# Patient Record
Sex: MALE | Race: WHITE | NOT HISPANIC OR LATINO | Employment: FULL TIME | URBAN - METROPOLITAN AREA
[De-identification: names, ages, dates, MRNs, and addresses within clinical notes are randomized per-mention and may not be internally consistent; named-entity substitution may affect disease eponyms.]

---

## 2018-11-02 ENCOUNTER — CONVERSION ENCOUNTER (OUTPATIENT)
Dept: FAMILY MEDICINE CLINIC | Facility: CLINIC | Age: 53
End: 2018-11-02

## 2018-11-02 ENCOUNTER — OFFICE VISIT CONVERTED (OUTPATIENT)
Dept: FAMILY MEDICINE CLINIC | Facility: CLINIC | Age: 53
End: 2018-11-02
Attending: NURSE PRACTITIONER

## 2019-04-03 ENCOUNTER — HOSPITAL ENCOUNTER (OUTPATIENT)
Dept: LAB | Facility: HOSPITAL | Age: 54
Discharge: HOME | End: 2019-04-03
Attending: NURSE PRACTITIONER

## 2019-04-03 ENCOUNTER — CONVERSION ENCOUNTER (OUTPATIENT)
Dept: FAMILY MEDICINE CLINIC | Facility: CLINIC | Age: 54
End: 2019-04-03

## 2019-04-03 ENCOUNTER — OFFICE VISIT CONVERTED (OUTPATIENT)
Dept: FAMILY MEDICINE CLINIC | Facility: CLINIC | Age: 54
End: 2019-04-03
Attending: NURSE PRACTITIONER

## 2019-04-03 ENCOUNTER — HOSPITAL ENCOUNTER (OUTPATIENT)
Dept: GENERAL RADIOLOGY | Facility: HOSPITAL | Age: 54
Discharge: HOME | End: 2019-04-03
Attending: NURSE PRACTITIONER

## 2019-04-03 LAB
BASOPHILS # BLD AUTO: 0.04 10*3/UL (ref 0–0.2)
BASOPHILS NFR BLD AUTO: 0.5 % (ref 0–3)
CONV ABS IMM GRAN: 0.02 10*3/UL (ref 0–0.2)
CONV IMMATURE GRAN: 0.2 % (ref 0–1.8)
DEPRECATED RDW RBC AUTO: 47.8 FL (ref 35.1–43.9)
EOSINOPHIL # BLD AUTO: 0.41 10*3/UL (ref 0–0.7)
EOSINOPHIL # BLD AUTO: 5.1 % (ref 0–7)
ERYTHROCYTE [DISTWIDTH] IN BLOOD BY AUTOMATED COUNT: 13.4 % (ref 11.6–14.4)
HBA1C MFR BLD: 15 G/DL (ref 14–18)
HCT VFR BLD AUTO: 46 % (ref 42–52)
LYMPHOCYTES # BLD AUTO: 1.96 10*3/UL (ref 1–5)
MCH RBC QN AUTO: 31.4 PG (ref 27–31)
MCHC RBC AUTO-ENTMCNC: 32.6 G/DL (ref 33–37)
MCV RBC AUTO: 96.4 FL (ref 80–96)
MONOCYTES # BLD AUTO: 0.72 10*3/UL (ref 0.2–1.2)
MONOCYTES NFR BLD AUTO: 8.9 % (ref 3–10)
NEUTROPHILS # BLD AUTO: 4.91 10*3/UL (ref 2–8)
NEUTROPHILS NFR BLD AUTO: 61 % (ref 30–85)
NRBC CBCN: 0 % (ref 0–0.7)
PLATELET # BLD AUTO: 141 10*3/UL (ref 130–400)
PMV BLD AUTO: 12.4 FL (ref 9.4–12.4)
RBC # BLD AUTO: 4.77 10*6/UL (ref 4.7–6.1)
VARIANT LYMPHS NFR BLD MANUAL: 24.3 % (ref 20–45)
WBC # BLD AUTO: 8.06 10*3/UL (ref 4.8–10.8)

## 2019-05-17 ENCOUNTER — OFFICE VISIT CONVERTED (OUTPATIENT)
Dept: FAMILY MEDICINE CLINIC | Facility: CLINIC | Age: 54
End: 2019-05-17
Attending: NURSE PRACTITIONER

## 2019-05-28 ENCOUNTER — HOSPITAL ENCOUNTER (OUTPATIENT)
Dept: GENERAL RADIOLOGY | Facility: HOSPITAL | Age: 54
Discharge: HOME | End: 2019-05-28
Attending: NURSE PRACTITIONER

## 2019-07-05 ENCOUNTER — OFFICE VISIT CONVERTED (OUTPATIENT)
Dept: ORTHOPEDIC SURGERY | Facility: CLINIC | Age: 54
End: 2019-07-05
Attending: ORTHOPAEDIC SURGERY

## 2019-07-05 ENCOUNTER — CONVERSION ENCOUNTER (OUTPATIENT)
Dept: ORTHOPEDIC SURGERY | Facility: CLINIC | Age: 54
End: 2019-07-05

## 2019-07-09 ENCOUNTER — HOSPITAL ENCOUNTER (OUTPATIENT)
Dept: PHYSICAL THERAPY | Facility: CLINIC | Age: 54
Setting detail: RECURRING SERIES
Discharge: HOME | End: 2019-08-23
Attending: ORTHOPAEDIC SURGERY

## 2019-08-06 ENCOUNTER — OFFICE VISIT CONVERTED (OUTPATIENT)
Dept: ORTHOPEDIC SURGERY | Facility: CLINIC | Age: 54
End: 2019-08-06
Attending: PHYSICIAN ASSISTANT

## 2020-04-06 ENCOUNTER — TELEMEDICINE CONVERTED (OUTPATIENT)
Dept: CARDIOLOGY | Facility: CLINIC | Age: 55
End: 2020-04-06
Attending: INTERNAL MEDICINE

## 2020-06-30 ENCOUNTER — HOSPITAL ENCOUNTER (OUTPATIENT)
Dept: LAB | Facility: HOSPITAL | Age: 55
Discharge: HOME | End: 2020-06-30
Attending: INTERNAL MEDICINE

## 2020-06-30 LAB
ALBUMIN SERPL-MCNC: 3.8 G/DL (ref 3.5–5)
ALBUMIN/GLOB SERPL: 1.3 {RATIO} (ref 1.4–2.6)
ALP SERPL-CCNC: 86 U/L (ref 56–119)
ALT SERPL-CCNC: 38 U/L (ref 10–40)
ANION GAP SERPL CALC-SCNC: 17 MMOL/L (ref 8–19)
AST SERPL-CCNC: 25 U/L (ref 15–50)
BILIRUB SERPL-MCNC: 0.68 MG/DL (ref 0.2–1.3)
BUN SERPL-MCNC: 15 MG/DL (ref 5–25)
BUN/CREAT SERPL: 14 {RATIO} (ref 6–20)
CALCIUM SERPL-MCNC: 9.1 MG/DL (ref 8.7–10.4)
CHLORIDE SERPL-SCNC: 112 MMOL/L (ref 99–111)
CHOLEST SERPL-MCNC: 88 MG/DL (ref 107–200)
CHOLEST/HDLC SERPL: 3.4 {RATIO} (ref 3–6)
CONV CO2: 20 MMOL/L (ref 22–32)
CONV TOTAL PROTEIN: 6.8 G/DL (ref 6.3–8.2)
CREAT UR-MCNC: 1.07 MG/DL (ref 0.7–1.2)
GFR SERPLBLD BASED ON 1.73 SQ M-ARVRAT: >60 ML/MIN/{1.73_M2}
GLOBULIN UR ELPH-MCNC: 3 G/DL (ref 2–3.5)
GLUCOSE SERPL-MCNC: 89 MG/DL (ref 70–99)
HDLC SERPL-MCNC: 26 MG/DL (ref 40–60)
LDLC SERPL CALC-MCNC: 44 MG/DL (ref 70–100)
OSMOLALITY SERPL CALC.SUM OF ELEC: 300 MOSM/KG (ref 273–304)
POTASSIUM SERPL-SCNC: 4.1 MMOL/L (ref 3.5–5.3)
SODIUM SERPL-SCNC: 145 MMOL/L (ref 135–147)
TRIGL SERPL-MCNC: 91 MG/DL (ref 40–150)
VLDLC SERPL-MCNC: 18 MG/DL (ref 5–37)

## 2020-07-06 ENCOUNTER — CONVERSION ENCOUNTER (OUTPATIENT)
Dept: CARDIOLOGY | Facility: CLINIC | Age: 55
End: 2020-07-06

## 2020-07-06 ENCOUNTER — OFFICE VISIT CONVERTED (OUTPATIENT)
Dept: CARDIOLOGY | Facility: CLINIC | Age: 55
End: 2020-07-06
Attending: INTERNAL MEDICINE

## 2021-01-06 ENCOUNTER — HOSPITAL ENCOUNTER (OUTPATIENT)
Dept: LAB | Facility: HOSPITAL | Age: 56
Discharge: HOME | End: 2021-01-06
Attending: INTERNAL MEDICINE

## 2021-01-06 LAB
ALBUMIN SERPL-MCNC: 4.1 G/DL (ref 3.5–5)
ALBUMIN/GLOB SERPL: 1.2 {RATIO} (ref 1.4–2.6)
ALP SERPL-CCNC: 82 U/L (ref 56–119)
ALT SERPL-CCNC: 49 U/L (ref 10–40)
ANION GAP SERPL CALC-SCNC: 15 MMOL/L (ref 8–19)
AST SERPL-CCNC: 32 U/L (ref 15–50)
BILIRUB SERPL-MCNC: 0.64 MG/DL (ref 0.2–1.3)
BUN SERPL-MCNC: 19 MG/DL (ref 5–25)
BUN/CREAT SERPL: 18 {RATIO} (ref 6–20)
CALCIUM SERPL-MCNC: 9.5 MG/DL (ref 8.7–10.4)
CHLORIDE SERPL-SCNC: 108 MMOL/L (ref 99–111)
CHOLEST SERPL-MCNC: 106 MG/DL (ref 107–200)
CHOLEST/HDLC SERPL: 3.1 {RATIO} (ref 3–6)
CONV CO2: 21 MMOL/L (ref 22–32)
CONV TOTAL PROTEIN: 7.4 G/DL (ref 6.3–8.2)
CREAT UR-MCNC: 1.05 MG/DL (ref 0.7–1.2)
EST. AVERAGE GLUCOSE BLD GHB EST-MCNC: 117 MG/DL
GFR SERPLBLD BASED ON 1.73 SQ M-ARVRAT: >60 ML/MIN/{1.73_M2}
GLOBULIN UR ELPH-MCNC: 3.3 G/DL (ref 2–3.5)
GLUCOSE SERPL-MCNC: 94 MG/DL (ref 70–99)
HBA1C MFR BLD: 5.7 % (ref 3.5–5.7)
HDLC SERPL-MCNC: 34 MG/DL (ref 40–60)
LDLC SERPL CALC-MCNC: 46 MG/DL (ref 70–100)
OSMOLALITY SERPL CALC.SUM OF ELEC: 292 MOSM/KG (ref 273–304)
POTASSIUM SERPL-SCNC: 4.2 MMOL/L (ref 3.5–5.3)
SODIUM SERPL-SCNC: 140 MMOL/L (ref 135–147)
TRIGL SERPL-MCNC: 132 MG/DL (ref 40–150)
VLDLC SERPL-MCNC: 26 MG/DL (ref 5–37)

## 2021-01-12 ENCOUNTER — OFFICE VISIT CONVERTED (OUTPATIENT)
Dept: CARDIOLOGY | Facility: CLINIC | Age: 56
End: 2021-01-12
Attending: INTERNAL MEDICINE

## 2021-01-28 ENCOUNTER — HOSPITAL ENCOUNTER (OUTPATIENT)
Dept: NUCLEAR MEDICINE | Facility: HOSPITAL | Age: 56
Discharge: HOME | End: 2021-01-28
Attending: INTERNAL MEDICINE

## 2021-02-10 ENCOUNTER — CONVERSION ENCOUNTER (OUTPATIENT)
Dept: CARDIOLOGY | Facility: CLINIC | Age: 56
End: 2021-02-10
Attending: INTERNAL MEDICINE

## 2021-05-10 NOTE — PROCEDURES
"   Procedure Note      Patient Name: Demetrio Mantilla   Patient ID: [id number]   Sex: Male   YOB: 1965    Primary Care Provider: Lonnie PATEL    Visit Date: February 10, 2021    Provider: Aaron Minaya MD   Location: [facility]   Location Address: [address] [place] [address], [address]   [place]  [id number]   Location Phone: [phone]          FINAL REPORT   TRANSTHORACIC ECHOCARDIOGRAM REPORT    Diagnosis: Chest pain, coronary artery disease.   Height: 6'3\" Weight: 251 B/P: 156/90 BSA: 2.4   Tech: JTW   MEASUREMENTS:  RVID (Diastole) : RVID. (NORMAL: 0.7 to 2.4 cm max)   LVID (Systole): 3.4 cm (Diastole): 5.1 cm . (NORMAL: 3.7 - 5.4 cm)   Posterior Wall Thickness (Diastole): 0.9 cm. (NORMAL: 0.8 - 1.1 cm)   Septal Thickness (Diastole): 0.9 cm. (NORMAL: 0.7 - 1.2 cm)   LAID (Systole): 3.6 cm. (NORMAL: 1.9 - 3.8 cm)   Aortic Root Diameter (Diastole): 3.5 cm. (NORMAL: 2.0 - 3.7 cm)   COMMENTS:  The patient underwent 2-D, M-Mode, and Doppler examination, including pulse-wave, continuous-wave, and color-flow analysis; the study is technically adequate.   FINDINGS:  MITRAL VALVE: Normal in appearance, opens well. No evidence of mitral valve prolapse. No mitral stenosis. Trace mitral regurgitation.   AORTIC VALVE: Normal trileaflet aortic valve, opens well. No evidence of aortic stenosis or regurgitation on Doppler examination.   TRICUSPID VALVE: Normal in appearance, opens well. Trace tricuspid regurgitation. Normal pulmonary artery systolic pressure.   PULMONIC VALVE: Grossly normal.   AORTIC ROOT: Normal in size with adequate motion.   LEFT ATRIUM: Normal in size. No intracavitary masses or clots seen. LA volume index is 27 mL/m2.   LEFT VENTRICLE: The left ventricular chamber size is normal. The left ventricular wall thickness is normal. There is mild mid inferior wall hypokinesis. Overall LV ejection fraction is 55%.   RIGHT VENTRICLE: Normal size and function.   RIGHT ATRIUM: Normal in size. "   PERICARDIUM: No evidence of pericardial effusion.   INFERIOR VENA CAVA: Measures 1.8 cm in diameter and there is more than 50% collapse during inspiration.   DOPPLER: E/A ratio is 1.2. DT= 261 msec. IVRT is 95 msec. E/E' is 7.   Faxed: 02/12/2021      CONCLUSION:  1.  Overall LV ejection fraction is 55% with mild mid inferior wall hypokinesis.   2.  No hemodynamically significant valvular abnormalities.       Aaron Minaya MD  JV/pap                 Electronically Signed by: Mireille Ramos-, Other -Author on February 12, 2021 10:30:06 AM  Electronically Co-signed by: Aaron Minaya MD -Reviewer on February 12, 2021 12:02:10 PM

## 2021-05-12 NOTE — PROGRESS NOTES
Quick Note      Patient Name: Demetrio Mantilla   Patient ID: [id number]   Sex: Male   YOB: 1965    Primary Care Provider: Lonnie PATEL    Visit Date: April 6, 2020    Provider: Aaron Minaya MD   Location: [facility]   Location Address: [address] [place] [address], [facility]   [place], [place]  [id number]   Location Phone: [phone]          History Of Present Illness  TELEHEALTH VISIT  Chief Complaint: Recent myocardial infarction.   Demetrio Mantilla is a 54-year-old male with no previous past medical history, who is presenting for evaluation via telehealth visit. Verbal consent obtained before beginning visit. He suffered an acute myocardial infarction on 03/19/2020. He underwent angioplasty and stent placement to completely occluded right coronary artery. Left ventricular ejection fraction was 55% at the time of discharge. He was discharged on dual anti-platelet therapy with ASA, Brilinta, statin and beta blockers. Today he reports feeling better. For the first one week after discharge, he felt very weak and tired. However, for the past one to two weeks, he is doing more activities and able to walk without any issues. He still gets a pain on the left side of the chest around the nipple, which he describes as a tenderness. No shortness of breath. No dizziness. No syncopal episodes. No palpitations. No bleeding manifestations. He is still smoking but trying to cut down.   Provider spent 7 minutes with the patient during telehealth visit.   The following staff were present during this visit: Provider only.   Past Medical History/Overview of Patient Symptoms     PAST MEDICAL HISTORY:  (1) Coronary artery disease.  Index presentation is acute myocardial infarction on 03/19/2020, s/p angioplasty and stent placement to right coronary artery.  (2) Negative for diabetes or hypertension.    PSYCHO/SOCIAL HISTORY:  He smokes 4 cigarettes per day but is trying to quit.  Drinks alcohol  rarely.    CURRENT MEDICATIONS:  Brilinta 90 mg b.i.d.; Metoprolol ER 25 mg 1/2 tablet daily; Atorvastatin 40 mg daily; ASA 81 mg daily.     REVIEW OF SYSTEMS:  Positive for chest pain and shortness of breath.  Negative for palpitations, swelling, chronic or frequent coughs, asthma or wheezing.       Vitals     Blood pressure today was 140/90 with heart rate of 76.  Blood pressure yesterday was 145/88 with heart rate of 82.           Assessment     ASSESSMENT AND PLAN:    1.  Coronary artery disease:  Recent acute myocardial infarction.  No angina-like chest pain at this time.  Taking       all the medicines.  Continue ASA, Brilinta, statin and beta blocker.  2.  High blood pressure:  Blood pressure not well controlled per home readings.  Increase Metoprolol to 25 mg       daily.  Will continue to keep blood pressure logs.  3.  Hyperlipidemia:  Continue Atorvastatin.  Will check lipid panel in 3 months.   4.  Patient may return to regular work in a week's time since he is already doing walking exercise and no major       symptoms.  Left ventricular function is preserved.  5.  Follow up in 3 months with repeat labs.    Telehealth visit due to COVID-19.    MD MARNI Magallon/mikala           This note was transcribed by Yanelis Vences.  mikala/MARNI  The above service was transcribed by Yanelis Vences, and I attest to the accuracy of the note.  JV               Electronically Signed by: Yanelis Vences-, -Author on April 6, 2020 12:26:04 PM  Electronically Co-signed by: Aaron Minaya MD -Reviewer on April 7, 2020 08:48:52 AM

## 2021-05-13 NOTE — PROGRESS NOTES
Progress Note      Patient Name: Demetrio Mantilla   Patient ID: [id number]   Sex: Male   YOB: 1965    Primary Care Provider: Lonnie PATEL    Visit Date: July 6, 2020    Provider: Aaron Minaya MD   Location: [facility]   Location Address: [address] [place] [address], [facility]   [place], [place]  [id number]   Location Phone: [phone]          Chief Complaint  · Follow-up visit for coronary artery disease   · Recent myocardial infarction       History Of Present Illness  REFERRING CARE PROVIDER: Lonnie PATEL   Demetrio Mantilla is a 54-year-old male with premature coronary artery disease, recent acute myocardial infarction, hyperlipidemia, who is here for a follow-up visit. Patient suffered an acute myocardial infarction in March of this year, for which was treated by angioplasty and stent placement to the right coronary artery. Left ventricular ejection fraction was 55% at the time of discharge. The follow-up visit was done by telehealth in April. The patient had a visit to the emergency room on 06/12/2020 because of chest pain. The symptoms were described as a sharp pain on the left side of the chest lasting for a few seconds. Since he had several episodes during the day, he went to the emergency room. Workup in the emergency room was unremarkable, and he was discharged home. No symptoms for the past 2 weeks. He is taking all the medicines as prescribed. Occasionally he will get palpitations, but no chest pain, no dizziness or syncopal episodes. He reports extensive skin bruising once he was started on Brilinta.   PAST MEDICAL HISTORY: (1) Coronary artery disease. Index presentation is acute myocardial infarction on 03/19/2020, s/p angioplasty and stent placement to the right coronary artery. (2) Negative for diabetes or hypertension.   PSYCHOSOCIAL HISTORY: No history of mood changes or depression. He rarely drinks alcohol. He smokes 1 pack of cigarettes per day.   CURRENT  "MEDICATIONS: include Brilinta 90 mg b.i.d.; ASA 81 mg daily; Atorvastatin 40 mg daily; Metoprolol ER 25 mg daily. The dosage and frequency of the medications were reviewed with the patient.       Review of Systems  · Cardiovascular  o Admits  o : palpitations (fast, fluttering, or skipping beats), chest pain or angina pectoris   o Denies  o : swelling (feet, ankles, hands), shortness of breath while walking or lying flat  · Respiratory  o Denies  o : chronic or frequent cough, asthma or wheezing      Vitals  Date Time BP Position Site L\R Cuff Size HR RR TEMP (F) WT  HT  BMI kg/m2 BSA m2 O2 Sat HC       07/06/2020 02:09 /74 Sitting    64 - R   245lbs 0oz 6'  3\" 30.62 2.43           Physical Examination  · Respiratory  o Auscultation of Lungs  o : Clear to auscultation bilaterally. No crackles or rhonchi.  · Cardiovascular  o Heart  o : S1, S2 is normally heard. No S3. No murmur, rubs, or gallops.  · Gastrointestinal  o Abdominal Examination  o : Soft, nontender, nondistended. No free fluid. Bowel sounds heard in all four quadrants.  · Extremities  o Extremities  o : Warm and well perfused. No pitting pedal edema. Distal pulses present.     EKG done at [facility] emergency room on 06/13/2020 showed sinus rhythm, old inferior infarct.  Abnormal EKG.    Labs done on 06/30/2020 showed TRG of 91, TC 88, LDL 44, HDL 18.  Sodium 145, potassium 4.1, BUN 15, creatinine 1.07.  AST 25, ALT 38.      Hemoglobin on 06/12/2020 is 13.6.               Assessment     ASSESSMENT AND PLAN:    1.  Coronary artery disease:  Recent myocardial infarction, currently no angina-like symptoms.  One emergency       room visit, but symptoms were atypical.  EKG is unremarkable at this time.  No further workup is required.       Continue Brilinta, aspirin, statin and beta blockers at the current dose.  2.  Hyperlipidemia:  LDL at goal.  Continue Atorvastatin at the current dose.  3.  Follow up in 6 months.  He was advised to " call our office earlier for any recurrent chest pain episodes in the       meantime.    MD MARNI Magallon/mikala           This note was transcribed by Yanelis Vences.  mikala/MARNI  The above service was transcribed by Yanelis Vences, and I attest to the accuracy of the note.  MARNI               Electronically Signed by: Yanelis Vences-, -Author on July 7, 2020 12:09:43 PM  Electronically Co-signed by: Aaron Minaya MD -Reviewer on July 7, 2020 12:54:29 PM

## 2021-05-14 VITALS
HEIGHT: 75 IN | BODY MASS INDEX: 31.21 KG/M2 | WEIGHT: 251 LBS | DIASTOLIC BLOOD PRESSURE: 90 MMHG | HEART RATE: 56 BPM | SYSTOLIC BLOOD PRESSURE: 156 MMHG

## 2021-05-14 NOTE — PROGRESS NOTES
Progress Note      Patient Name: Demetrio Mantilla   Patient ID: [id number]   Sex: Male   YOB: 1965    Primary Care Provider: Lonnie PATEL    Visit Date: January 12, 2021    Provider: Aaron Minaya MD   Location: [facility]   Location Address: [address] [place] [address], [place], [place]  [id number]   Location Phone: [phone]          Chief Complaint     Followup visit for coronary artery disease.  Patient reports chest pain.       History Of Present Illness  REFERRING CARE PROVIDER: Lonnie PATEL   Demetrio Mantilla is a 55 year old /White male with premature coronary artery disease, previous myocardial infarction in early 2020, and hyperlipidemia who is here for a followup visit. He was last seen in the office on 07/05/2020, and no medication changes were made. Today, patient reports having occasional chest pain. This happens 3 or 4 times a month, usually on moderate exertion, slight pain on the left side of the chest. Currently denies having any palpitations, shortness of breath, dizziness, or syncopal episodes. Taking all the medications as prescribed.   PAST MEDICAL HISTORY: 1) Coronary artery disease. Index presentation is acute myocardial infarction on 03/19/2020, status post angioplasty and stent placement to the right coronary artery; 2) Negative for diabetes mellitus or hypertension.   PSYCHOSOCIAL HISTORY: Current smoker of one pack per day. Moderate alcohol consumption.   CURRENT MEDICATIONS: Brilinta 90 mg b.i.d.; metoprolol ER 25 mg daily; atorvastatin 40 mg daily; aspirin 81 mg daily.      ALLERGIES:  No known drug allergies.       Review of Systems  · Cardiovascular  o Admits  o : chest pain or angina pectoris   o Denies  o : palpitations (fast, fluttering, or skipping beats), swelling (feet, ankles, hands), shortness of breath while walking or lying flat  · Respiratory  o Denies  o : chronic or frequent cough      Vitals  Date Time BP Position Site L\R Cuff  "Size HR RR TEMP (F) WT  HT  BMI kg/m2 BSA m2 O2 Sat FR L/min FiO2 HC       01/12/2021 10:41 /90 Sitting    56 - R   250lbs 16oz 6'  3\" 31.37 2.45       01/12/2021 10:41 /92 Sitting                       Physical Examination  · Respiratory  o Auscultation of Lungs  o : Clear to auscultation bilaterally. No crackles or rhonchi.  · Cardiovascular  o Heart  o : S1, S2 is normally heard. No S3. No murmur, rubs, or gallops.  · Gastrointestinal  o Abdominal Examination  o : Soft, nontender, nondistended. No free fluid. Bowel sounds heard in all four quadrants.  · Extremities  o Extremities  o : Warm and well perfused. No pitting pedal edema. Distal pulses present.  · EKG  o EKG  o : Performed in the office today.  o Indications  o : Chest pain.  o Results  o : Normal sinus rhythm, normal axis, old inferior wall myocardial infarction. Abnormal EKG.  o Comparison  o : When compared to previous EKG on 06/13/2020, there are no changes.   · Labs  o Labs  o : Labs on 01/06/2021 shows triglycerides of 132, total cholesterol 106, LDL 46, HDL 34, sodium 142, potassium 4.2, chloride 108, BUN 19, creatinine 1.05, hemoglobin A1c 5.7%, AST 32, ALT 49.           Assessment     ASSESSMENT & PLAN:    1.  Chest pain.  Symptoms have both typical and atypical features for angina, but they are new onset.  The        patient has a history of myocardial infarction in early 2020.  Today's EKG is unremarkable, except for an old        inferior wall infarct.  At this time, because of new onset symptoms in the background of acute myocardial        infarction in the past, we will proceed with a SPECT stress test to rule out reversible ischemia.  We will        continue aspirin, Brilinta, statin, and beta-blocker, but will decrease the dose of Brilinta to 60 mg twice        daily in March of this year.  2.  Hyperlipidemia.  LDL at goal.  Mild elevation of ALT noted.  We will continue atorvastatin at the current        dose.    3.  Tobacco " use.  Counseled the patient regarding tobacco cessation.  4.  Follow up with SPECT report.             Electronically Signed by: Maricruz Smalls-, Other -Author on January 26, 2021 08:13:09 PM  Electronically Co-signed by: Aaron Minaya MD -Reviewer on January 27, 2021 08:17:42 AM

## 2021-05-15 VITALS — WEIGHT: 246 LBS | BODY MASS INDEX: 30.59 KG/M2 | HEART RATE: 83 BPM | HEIGHT: 75 IN | OXYGEN SATURATION: 97 %

## 2021-05-15 VITALS
HEART RATE: 64 BPM | WEIGHT: 245 LBS | SYSTOLIC BLOOD PRESSURE: 132 MMHG | DIASTOLIC BLOOD PRESSURE: 74 MMHG | BODY MASS INDEX: 30.46 KG/M2 | HEIGHT: 75 IN

## 2021-05-15 VITALS — BODY MASS INDEX: 30.63 KG/M2 | WEIGHT: 246.37 LBS | HEIGHT: 75 IN | OXYGEN SATURATION: 96 % | HEART RATE: 76 BPM

## 2021-05-15 VITALS
HEART RATE: 67 BPM | SYSTOLIC BLOOD PRESSURE: 128 MMHG | HEIGHT: 75 IN | BODY MASS INDEX: 30.09 KG/M2 | TEMPERATURE: 98 F | OXYGEN SATURATION: 96 % | DIASTOLIC BLOOD PRESSURE: 74 MMHG | WEIGHT: 242 LBS | RESPIRATION RATE: 16 BRPM

## 2021-05-15 VITALS
SYSTOLIC BLOOD PRESSURE: 118 MMHG | HEART RATE: 59 BPM | RESPIRATION RATE: 16 BRPM | TEMPERATURE: 97.3 F | OXYGEN SATURATION: 96 % | WEIGHT: 238 LBS | BODY MASS INDEX: 29.59 KG/M2 | HEIGHT: 75 IN | DIASTOLIC BLOOD PRESSURE: 78 MMHG

## 2021-05-16 VITALS
DIASTOLIC BLOOD PRESSURE: 80 MMHG | HEART RATE: 63 BPM | RESPIRATION RATE: 16 BRPM | HEIGHT: 75 IN | SYSTOLIC BLOOD PRESSURE: 135 MMHG | TEMPERATURE: 96.9 F | OXYGEN SATURATION: 96 % | WEIGHT: 241 LBS | BODY MASS INDEX: 29.97 KG/M2

## 2021-05-20 ENCOUNTER — HOSPITAL ENCOUNTER (OUTPATIENT)
Dept: INFUSION THERAPY | Facility: HOSPITAL | Age: 56
Setting detail: HOSPITAL OUTPATIENT SURGERY
Discharge: HOME | End: 2021-05-20
Attending: INTERNAL MEDICINE

## 2021-05-20 LAB
ANION GAP SERPL CALC-SCNC: 10 MMOL/L (ref 8–19)
BASOPHILS # BLD AUTO: 0.05 10*3/UL (ref 0–0.2)
BASOPHILS NFR BLD AUTO: 0.6 % (ref 0–3)
BUN SERPL-MCNC: 18 MG/DL (ref 5–25)
BUN/CREAT SERPL: 17 {RATIO} (ref 6–20)
CALCIUM SERPL-MCNC: 8.7 MG/DL (ref 8.7–10.4)
CHLORIDE SERPL-SCNC: 109 MMOL/L (ref 99–111)
CONV ABS IMM GRAN: 0.02 10*3/UL (ref 0–0.2)
CONV CO2: 24 MMOL/L (ref 22–32)
CONV IMMATURE GRAN: 0.2 % (ref 0–1.8)
CREAT UR-MCNC: 1.06 MG/DL (ref 0.7–1.2)
DEPRECATED RDW RBC AUTO: 45.4 FL (ref 35.1–43.9)
EOSINOPHIL # BLD AUTO: 0.51 10*3/UL (ref 0–0.7)
EOSINOPHIL # BLD AUTO: 6.1 % (ref 0–7)
ERYTHROCYTE [DISTWIDTH] IN BLOOD BY AUTOMATED COUNT: 13.4 % (ref 11.6–14.4)
GFR SERPLBLD BASED ON 1.73 SQ M-ARVRAT: >60 ML/MIN/{1.73_M2}
GLUCOSE SERPL-MCNC: 98 MG/DL (ref 70–99)
HCT VFR BLD AUTO: 41.5 % (ref 42–52)
HGB BLD-MCNC: 14 G/DL (ref 14–18)
INR PPP: 1 (ref 2–3)
LYMPHOCYTES # BLD AUTO: 1.94 10*3/UL (ref 1–5)
LYMPHOCYTES NFR BLD AUTO: 23.2 % (ref 20–45)
MCH RBC QN AUTO: 31.2 PG (ref 27–31)
MCHC RBC AUTO-ENTMCNC: 33.7 G/DL (ref 33–37)
MCV RBC AUTO: 92.4 FL (ref 80–96)
MONOCYTES # BLD AUTO: 0.7 10*3/UL (ref 0.2–1.2)
MONOCYTES NFR BLD AUTO: 8.4 % (ref 3–10)
NEUTROPHILS # BLD AUTO: 5.13 10*3/UL (ref 2–8)
NEUTROPHILS NFR BLD AUTO: 61.5 % (ref 30–85)
NRBC CBCN: 0 % (ref 0–0.7)
OSMOLALITY SERPL CALC.SUM OF ELEC: 290 MOSM/KG (ref 273–304)
PLATELET # BLD AUTO: 141 10*3/UL (ref 130–400)
PMV BLD AUTO: 11.6 FL (ref 9.4–12.4)
POTASSIUM SERPL-SCNC: 4 MMOL/L (ref 3.5–5.3)
PROTHROMBIN TIME: 10.9 S (ref 9.4–12)
RBC # BLD AUTO: 4.49 10*6/UL (ref 4.7–6.1)
SODIUM SERPL-SCNC: 139 MMOL/L (ref 135–147)
WBC # BLD AUTO: 8.35 10*3/UL (ref 4.8–10.8)

## 2021-05-28 NOTE — H&P
Patient: KRYSTIAN MANTILLA     Acct: [id number]     Report: #[id number]  MR #:  [id number]     DOS: 2021 1141     : 1965  DICTATING: CYNTHIA OLIVA  ***Signed***  --------------------------------------------------------------------------------------------------------------------                              [employer] [facility]                          [place], [place]  [address]           __________________________________________________________________________         Patient Name:                   Attending Physician:    Krystian Mantilla M.D.         Patient Visit # MR #            Admit Date  Disch Date     Location    [id number]    [id number]      2021                 [id number]         Date of Birth    1965    __________________________________________________________________________    0819 - HISTORY AND PHYSICAL         DATE OF VISIT:    2021.         REASON FOR VISIT:    Elective left heart catheterization.         HISTORY OF PRESENT ILLNESS:    This is a 55 year-old male with premature coronary artery disease, previous    myocardial infarction in , and hyperlipidemia.  The patient is    complaining of episodic and exertional chest pain for the past several    months.  He had a SPECT stress test done in 2021 which showed old    inferior wall infarct with mild yina-infarct ischemia.  Medical management    was continued, however, he had a visit to the Emergency Room last week with    severe chest pain.  He was discharged home after ruling out acute coronary    syndrome.  Today, he was brought back to the hospital for elective left heart    catheterization, coronary angiography to assess the stent patency and also to    rule out any other new lesions because of significant symptoms.  The patient    currently denies any active chest pain.  Pain happens  mostly with exertion.    Denies any shortness of breath, palpitations or dizziness.         PAST MEDICAL HISTORY:    1. Coronary artery disease.  Index presentation acute myocardial infarction    on 03/19/2020, status post angioplasty and stent placement to right coronary    artery.    2. Negative for diabetes mellitus or hypertension.         SOCIAL HISTORY:    The patient smokes a pack of cigarettes per day.  Reports moderate alcohol    consumption.         ALLERGIES:    No known drug allergies.         REVIEW OF SYSTEMS:    Ten point review of systems done and found negative except for those    mentioned in the history of present illness.         HOME MEDICATIONS:    1. Aspirin 81 mg p.o. daily.    2. Brilinta 60 mg p.o. twice daily.    3. Metoprolol ER 25 mg p.o. once daily.    4. Atorvastatin 40 mg p.o. daily.         PHYSICAL EXAMINATION:    VITAL SIGNS:  Blood pressure 131/85, heart rate 56, temperature 97.9, oxygen    saturation 97% on room air.    GENERAL:  The patient is alert and awake in no acute distress, lying down    comfortably in bed.    HEENT:  No pallor, anicteric.  Extraocular movements are normal.  Pupils are    equal and reacting to light bilaterally.  Moist mucous membranes.    NECK:  Supple.  No carotid bruits.  Thyroid not palpable.    CARDIOVASCULAR:  Regular rate and rhythm.  No murmurs, rubs or gallops.    LUNGS:  Clear to auscultation bilaterally.  No crackles or rhonchi.    ABDOMEN:  Soft, nontender, nondistended.  No free fluid.  Bowel sounds are    heard in all four quadrants.    EXTREMITIES:  Warm and well-perfused.  Distal pulses are present.  No edema.    NEUROLOGICAL EXAMINATION:  Awake, alert and oriented x 3.  No focal    neurological deficits.    SKIN:  No skin rashes.         LABORATORY DATA:    Labs done today showed hemoglobin 14.0, hematocrit 41.5, WBC count 8.35,    platelet count 141.         Sodium 139, potassium 4.0, chloride 109, bicarb 24, BUN 18, creatinine 1.06,     calcium 8.7, INR 1.00.         DIAGNOSTIC STUDIES:    EKG done this morning showed sinus rhythm, old inferior wall infarct.    Abnormal EKG.         ASSESSMENT AND PLAN:    1. Chest pain/angina.  The patient reports exertional angina.  He has a    history of coronary artery disease and previous myocardial infarction.    Recent SPECT study showed mild inferior wall ischemia.  He is on appropriate    medical management.  We will proceed with cardiac catheterization to    delineate the coronary anatomy and angioplasty if indicated.  The risks,    benefits and alternatives of the test were explained in detail to the    patient, and he agreed to proceed.        Further management plans based on cath outcomes.         To be electronically signed in [employer]    [id number] KARO DENNEY:nohemy    D:  05/20/2021 09:22    T:  05/20/2021 10:47    #[id number]         CC: JORGE KATE         Until signed, this is an unconfirmed preliminary report that may contain    errors and is subject to change.         Electronically signed by CYNTHIA OLIVA  05/20/2021 14:49     Disclaimer: Converted hospital document may not contain table formatting or lab diagrams. Please see [facility] for authenticated document.

## 2021-08-31 ENCOUNTER — TELEPHONE (OUTPATIENT)
Dept: CARDIOLOGY | Facility: CLINIC | Age: 56
End: 2021-08-31

## 2021-08-31 NOTE — TELEPHONE ENCOUNTER
Procedure: Tooth Extraction and Implant Placement    Med Directive: Brilinta    PMH: CAD s/p PTCA and stent placement to RCA    Last Seen: In office 1/12/2021; Cath 5/20/2021; Was supposed to f/u 8/24/2021 but no showed to appt.

## 2022-03-25 RX ORDER — TICAGRELOR 60 MG/1
TABLET ORAL
Qty: 180 TABLET | Refills: 3 | OUTPATIENT
Start: 2022-03-25

## 2022-05-18 RX ORDER — METOPROLOL SUCCINATE 25 MG/1
TABLET, EXTENDED RELEASE ORAL
Qty: 90 TABLET | Refills: 2 | OUTPATIENT
Start: 2022-05-18

## 2022-05-23 RX ORDER — TICAGRELOR 60 MG/1
60 TABLET ORAL 2 TIMES DAILY
Qty: 60 TABLET | Refills: 0 | Status: SHIPPED | OUTPATIENT
Start: 2022-05-23 | End: 2022-06-20

## 2022-05-24 ENCOUNTER — OFFICE VISIT (OUTPATIENT)
Dept: CARDIOLOGY | Facility: CLINIC | Age: 57
End: 2022-05-24

## 2022-05-24 VITALS
HEART RATE: 58 BPM | BODY MASS INDEX: 30.96 KG/M2 | DIASTOLIC BLOOD PRESSURE: 90 MMHG | HEIGHT: 75 IN | WEIGHT: 249 LBS | SYSTOLIC BLOOD PRESSURE: 144 MMHG

## 2022-05-24 DIAGNOSIS — E78.5 HYPERLIPIDEMIA, UNSPECIFIED HYPERLIPIDEMIA TYPE: Primary | Chronic | ICD-10-CM

## 2022-05-24 DIAGNOSIS — I25.10 CORONARY ARTERY DISEASE INVOLVING NATIVE HEART WITHOUT ANGINA PECTORIS, UNSPECIFIED VESSEL OR LESION TYPE: Chronic | ICD-10-CM

## 2022-05-24 DIAGNOSIS — F17.219 CIGARETTE NICOTINE DEPENDENCE WITH NICOTINE-INDUCED DISORDER: ICD-10-CM

## 2022-05-24 DIAGNOSIS — I10 HYPERTENSION, ESSENTIAL: Chronic | ICD-10-CM

## 2022-05-24 PROCEDURE — 99204 OFFICE O/P NEW MOD 45 MIN: CPT | Performed by: NURSE PRACTITIONER

## 2022-05-24 RX ORDER — NITROGLYCERIN 0.4 MG/1
TABLET SUBLINGUAL
Qty: 100 TABLET | Refills: 11 | Status: SHIPPED | OUTPATIENT
Start: 2022-05-24

## 2022-05-24 RX ORDER — LOSARTAN POTASSIUM 25 MG/1
25 TABLET ORAL DAILY
Qty: 90 TABLET | Refills: 3 | Status: SHIPPED | OUTPATIENT
Start: 2022-05-24 | End: 2023-01-16 | Stop reason: SDUPTHER

## 2022-05-24 NOTE — ASSESSMENT & PLAN NOTE
Without angina.  Continue aspirin 81 mg and Brilinta 60 mg twice daily.  We will give him nitro stat with instructions on use.

## 2022-05-24 NOTE — ASSESSMENT & PLAN NOTE
Needs tighter control of blood pressures.  Start losartan 25 mg in the morning.  Take metoprolol ER 20 5 at night.  He will do blood pressure log for 2 weeks will adjust meds accordingly.

## 2022-05-24 NOTE — ASSESSMENT & PLAN NOTE
I have educated the patient on the risk of diseases from using tobacco products such as heart disease. Patient verbalizes understanding of the need for smoking cessation but is unwilling to attempt at this time.

## 2022-05-24 NOTE — ASSESSMENT & PLAN NOTE
Unknown control.  He will have labs done in the next week and will adjust meds accordingly.  Continue atorvastatin 40 mg for now

## 2022-05-24 NOTE — PROGRESS NOTES
Chief Complaint  Coronary Artery Disease and Hypertension    Subjective        Demetrio Mantilla presents to [facility] CARDIOLOGY  Is a 56-year-old white male who has not been in the office in over a year and a half.  Comes in to evaluate his coronary disease and hypertension.  He did have an episode of extreme high blood pressures was told to start lisinopril back in October 2021 but he never did.  He recently went to the dentist and his blood pressure was so high they would not do the procedure.  Subsequently he came back a month later blood pressure was better and it was completed.  Patient said he does not watch his blood pressures often but it tends to run in the range we are right now which is around 144/90.  Has occasional chest discomfort on the left side of his chest not necessarily associated with activities.  Is unchanged from when they did his last cardiac cath.  Denies any  shortness of breath, palpitations, dizziness, syncope, swelling, PND, or orthopnea.  Cardiac wise he has no complaints.  Continues to smoke with no real desire to stop.  Not had any recent labs.        Past History:    Past Medical History:   Diagnosis Date   • Coronary artery disease    • Hyperlipidemia    • Hypertension    • Limb swelling         Family History: family history includes Cancer in his mother; Diabetes in his brother; No Known Problems in his father.     Social History: reports that he has been smoking cigarettes. He has a 25.00 pack-year smoking history. He has never used smokeless tobacco. He reports current alcohol use. He reports that he does not use drugs.    Allergies: Patient has no known allergies.    Past Surgical History:   Procedure Laterality Date   • COLONOSCOPY  01/01/2016          Current Outpatient Medications:   •  aspirin 81 MG chewable tablet, Chew 81 mg Daily., Disp: , Rfl:   •  atorvastatin (LIPITOR) 40 MG tablet, Take 40 mg by mouth Every Night., Disp: , Rfl:   •  Brilinta  "60 MG tablet tablet, Take 1 tablet by mouth 2 (Two) Times a Day., Disp: 60 tablet, Rfl: 0  •  metoprolol succinate XL (TOPROL-XL) 25 MG 24 hr tablet, Take 25 mg by mouth Daily., Disp: , Rfl:   •  losartan (Cozaar) 25 MG tablet, Take 1 tablet by mouth Daily., Disp: 90 tablet, Rfl: 3  •  nitroglycerin (NITROSTAT) 0.4 MG SL tablet, 1 under the tongue as needed for angina, may repeat q5mins for up three doses, Disp: 100 tablet, Rfl: 11     Medications Discontinued During This Encounter   Medication Reason   • lisinopril (PRINIVIL,ZESTRIL) 10 MG tablet Discontinued by another clinician        Review of Systems   Constitutional: Negative for fatigue.   Respiratory: Negative for cough and shortness of breath.    Cardiovascular: Negative for chest pain, palpitations and leg swelling.   Neurological: Positive for headache. Negative for dizziness.   All other systems reviewed and are negative.       Objective     Physical Exam  Constitutional:       General: He is not in acute distress.     Appearance: He is obese.   Neck:      Vascular: No carotid bruit.   Cardiovascular:      Rate and Rhythm: Normal rate and regular rhythm.      Heart sounds: Normal heart sounds.   Pulmonary:      Effort: Pulmonary effort is normal.      Breath sounds: Normal breath sounds.   Musculoskeletal:      Right lower leg: No edema.      Left lower leg: No edema.   Neurological:      Mental Status: He is alert.       /90   Pulse 58   Ht 190.5 cm (75\")   Wt 113 kg (249 lb)   BMI 31.12 kg/m²       Vitals:    05/24/22 1327   BP: 144/90   Pulse: 58       Result Review :         The following data was reviewed by: JORGE Saldaña on 05/24/2022:           Lab Results   Component Value Date    TSH 1.660 03/20/2020      Lab Results   Component Value Date    FREET4 0.9 03/20/2020        Magnesium   Date Value Ref Range Status   05/09/2021 1.84 1.60 - 2.30 mg/dL Final                Assessment and Plan    Diagnoses and all orders for this " visit:    1. Hyperlipidemia, unspecified hyperlipidemia type (Primary)  Assessment & Plan:  Unknown control.  He will have labs done in the next week and will adjust meds accordingly.  Continue atorvastatin 40 mg for now    Orders:  -     Lipid Panel; Future  -     Comprehensive Metabolic Panel; Future  -     Lipid Panel; Future  -     Comprehensive Metabolic Panel; Future    2. Hypertension, essential  Assessment & Plan:  Needs tighter control of blood pressures.  Start losartan 25 mg in the morning.  Take metoprolol ER 20 5 at night.  He will do blood pressure log for 2 weeks will adjust meds accordingly.    Orders:  -     losartan (Cozaar) 25 MG tablet; Take 1 tablet by mouth Daily.  Dispense: 90 tablet; Refill: 3    3. Coronary artery disease involving native heart without angina pectoris, unspecified vessel or lesion type  Assessment & Plan:  Without angina.  Continue aspirin 81 mg and Brilinta 60 mg twice daily.  We will give him nitro stat with instructions on use.    Orders:  -     CBC & Differential; Future  -     nitroglycerin (NITROSTAT) 0.4 MG SL tablet; 1 under the tongue as needed for angina, may repeat q5mins for up three doses  Dispense: 100 tablet; Refill: 11  -     CBC & Differential; Future    4. Cigarette nicotine dependence with nicotine-induced disorder  Assessment & Plan:  I have educated the patient on the risk of diseases from using tobacco products such as heart disease. Patient verbalizes understanding of the need for smoking cessation but is unwilling to attempt at this time.              Follow Up     Return in about 6 months (around 11/24/2022) for with Dr. Minaya.    Patient was given instructions and counseling regarding his condition or for health maintenance advice. Please see specific information pulled into the AVS if appropriate.       Nika Martin, JORGE  05/24/22 13:26 EDT

## 2022-05-27 ENCOUNTER — LAB (OUTPATIENT)
Dept: LAB | Facility: HOSPITAL | Age: 57
End: 2022-05-27

## 2022-05-27 DIAGNOSIS — E78.5 HYPERLIPIDEMIA, UNSPECIFIED HYPERLIPIDEMIA TYPE: Chronic | ICD-10-CM

## 2022-05-27 DIAGNOSIS — I25.10 CORONARY ARTERY DISEASE INVOLVING NATIVE HEART WITHOUT ANGINA PECTORIS, UNSPECIFIED VESSEL OR LESION TYPE: ICD-10-CM

## 2022-05-27 LAB
ALBUMIN SERPL-MCNC: 4.2 G/DL (ref 3.5–5.2)
ALBUMIN/GLOB SERPL: 1.6 G/DL
ALP SERPL-CCNC: 70 U/L (ref 39–117)
ALT SERPL W P-5'-P-CCNC: 33 U/L (ref 1–41)
ANION GAP SERPL CALCULATED.3IONS-SCNC: 7.8 MMOL/L (ref 5–15)
AST SERPL-CCNC: 27 U/L (ref 1–40)
BASOPHILS # BLD AUTO: 0.05 10*3/MM3 (ref 0–0.2)
BASOPHILS NFR BLD AUTO: 0.6 % (ref 0–1.5)
BILIRUB SERPL-MCNC: 0.7 MG/DL (ref 0–1.2)
BUN SERPL-MCNC: 19 MG/DL (ref 6–20)
BUN/CREAT SERPL: 17 (ref 7–25)
CALCIUM SPEC-SCNC: 9.6 MG/DL (ref 8.6–10.5)
CHLORIDE SERPL-SCNC: 108 MMOL/L (ref 98–107)
CHOLEST SERPL-MCNC: 99 MG/DL (ref 0–200)
CO2 SERPL-SCNC: 24.2 MMOL/L (ref 22–29)
CREAT SERPL-MCNC: 1.12 MG/DL (ref 0.76–1.27)
DEPRECATED RDW RBC AUTO: 47 FL (ref 37–54)
EGFRCR SERPLBLD CKD-EPI 2021: 77.6 ML/MIN/1.73
EOSINOPHIL # BLD AUTO: 0.49 10*3/MM3 (ref 0–0.4)
EOSINOPHIL NFR BLD AUTO: 5.8 % (ref 0.3–6.2)
ERYTHROCYTE [DISTWIDTH] IN BLOOD BY AUTOMATED COUNT: 13.4 % (ref 12.3–15.4)
GLOBULIN UR ELPH-MCNC: 2.6 GM/DL
GLUCOSE SERPL-MCNC: 99 MG/DL (ref 65–99)
HCT VFR BLD AUTO: 45.5 % (ref 37.5–51)
HDLC SERPL-MCNC: 29 MG/DL (ref 40–60)
HGB BLD-MCNC: 15.4 G/DL (ref 13–17.7)
IMM GRANULOCYTES # BLD AUTO: 0.07 10*3/MM3 (ref 0–0.05)
IMM GRANULOCYTES NFR BLD AUTO: 0.8 % (ref 0–0.5)
LDLC SERPL CALC-MCNC: 54 MG/DL (ref 0–100)
LDLC/HDLC SERPL: 1.86 {RATIO}
LYMPHOCYTES # BLD AUTO: 1.36 10*3/MM3 (ref 0.7–3.1)
LYMPHOCYTES NFR BLD AUTO: 16.2 % (ref 19.6–45.3)
MCH RBC QN AUTO: 32.1 PG (ref 26.6–33)
MCHC RBC AUTO-ENTMCNC: 33.8 G/DL (ref 31.5–35.7)
MCV RBC AUTO: 94.8 FL (ref 79–97)
MONOCYTES # BLD AUTO: 0.67 10*3/MM3 (ref 0.1–0.9)
MONOCYTES NFR BLD AUTO: 8 % (ref 5–12)
NEUTROPHILS NFR BLD AUTO: 5.76 10*3/MM3 (ref 1.7–7)
NEUTROPHILS NFR BLD AUTO: 68.6 % (ref 42.7–76)
NRBC BLD AUTO-RTO: 0 /100 WBC (ref 0–0.2)
PLATELET # BLD AUTO: 159 10*3/MM3 (ref 140–450)
PMV BLD AUTO: 11.7 FL (ref 6–12)
POTASSIUM SERPL-SCNC: 5.2 MMOL/L (ref 3.5–5.2)
PROT SERPL-MCNC: 6.8 G/DL (ref 6–8.5)
RBC # BLD AUTO: 4.8 10*6/MM3 (ref 4.14–5.8)
SODIUM SERPL-SCNC: 140 MMOL/L (ref 136–145)
TRIGL SERPL-MCNC: 80 MG/DL (ref 0–150)
VLDLC SERPL-MCNC: 16 MG/DL (ref 5–40)
WBC NRBC COR # BLD: 8.4 10*3/MM3 (ref 3.4–10.8)

## 2022-05-27 PROCEDURE — 80061 LIPID PANEL: CPT

## 2022-05-27 PROCEDURE — 80053 COMPREHEN METABOLIC PANEL: CPT

## 2022-05-27 PROCEDURE — 36415 COLL VENOUS BLD VENIPUNCTURE: CPT

## 2022-05-27 PROCEDURE — 85025 COMPLETE CBC W/AUTO DIFF WBC: CPT

## 2022-05-31 ENCOUNTER — TELEPHONE (OUTPATIENT)
Dept: CARDIOLOGY | Facility: CLINIC | Age: 57
End: 2022-05-31

## 2022-06-20 RX ORDER — TICAGRELOR 60 MG/1
TABLET ORAL
Qty: 180 TABLET | Refills: 2 | Status: SHIPPED | OUTPATIENT
Start: 2022-06-20

## 2022-06-21 RX ORDER — ATORVASTATIN CALCIUM 40 MG/1
40 TABLET, FILM COATED ORAL NIGHTLY
Qty: 90 TABLET | Refills: 2 | Status: SHIPPED | OUTPATIENT
Start: 2022-06-21

## 2022-06-21 RX ORDER — METOPROLOL SUCCINATE 25 MG/1
25 TABLET, EXTENDED RELEASE ORAL DAILY
Qty: 90 TABLET | Refills: 2 | Status: SHIPPED | OUTPATIENT
Start: 2022-06-21 | End: 2023-03-15

## 2022-12-07 ENCOUNTER — TELEPHONE (OUTPATIENT)
Dept: CARDIOLOGY | Facility: CLINIC | Age: 57
End: 2022-12-07

## 2022-12-07 NOTE — TELEPHONE ENCOUNTER
He may proceed with dental procedure at moderate risk for perioperative cardiac events.  He may hold Brilinta for 5 days prior to procedure as requested.

## 2022-12-07 NOTE — TELEPHONE ENCOUNTER
URGENT- Awaiting approval    Procedure: Removal Impacted Tooth    Medication Directive: Brilinta 5 days    PMH: HLD, HTN, CAD s/p PCI (3/25/2020)    Last Seen:05/24/22    ECHO 03/23/2020    1.  Normal ejection fraction of 55%.    2.  Mild left ventricular hypertrophy.    3.  Dilated IVC consistent with increased right atrial filling pressures.     Cardiac Cath 05/21/2021  . Patent stent noted in the proximal to mid right coronary artery with no    in-stent re-stenosis.    2. Non-obstructive coronary artery disease noted in diagonal 1 branch, which    is unchanged from previous angiograms in 2020.    3. Overall LV ejection fraction is 55% with mild mid inferior wall    hypokinesis.    4. There are no culprit lesions identified for patient presentation of    unstable angina indicating that the symptoms are likely non-cardiac.

## 2022-12-17 ENCOUNTER — APPOINTMENT (OUTPATIENT)
Dept: MRI IMAGING | Facility: HOSPITAL | Age: 57
End: 2022-12-17

## 2022-12-17 ENCOUNTER — APPOINTMENT (OUTPATIENT)
Dept: CT IMAGING | Facility: HOSPITAL | Age: 57
End: 2022-12-17

## 2022-12-17 ENCOUNTER — HOSPITAL ENCOUNTER (EMERGENCY)
Facility: HOSPITAL | Age: 57
Discharge: HOME | End: 2022-12-17
Attending: EMERGENCY MEDICINE | Admitting: EMERGENCY MEDICINE

## 2022-12-17 VITALS
HEIGHT: 73 IN | BODY MASS INDEX: 33.83 KG/M2 | OXYGEN SATURATION: 93 % | TEMPERATURE: 97.9 F | WEIGHT: 255.29 LBS | RESPIRATION RATE: 18 BRPM | HEART RATE: 54 BPM | DIASTOLIC BLOOD PRESSURE: 61 MMHG | SYSTOLIC BLOOD PRESSURE: 109 MMHG

## 2022-12-17 DIAGNOSIS — R10.9 ABDOMINAL PAIN OF UNKNOWN CAUSE: Primary | ICD-10-CM

## 2022-12-17 LAB
ALBUMIN SERPL-MCNC: 4.4 G/DL (ref 3.5–5.2)
ALBUMIN/GLOB SERPL: 1.4 G/DL
ALP SERPL-CCNC: 81 U/L (ref 39–117)
ALT SERPL W P-5'-P-CCNC: 35 U/L (ref 1–41)
ANION GAP SERPL CALCULATED.3IONS-SCNC: 9 MMOL/L (ref 5–15)
AST SERPL-CCNC: 25 U/L (ref 1–40)
BASOPHILS # BLD AUTO: 0.06 10*3/MM3 (ref 0–0.2)
BASOPHILS NFR BLD AUTO: 0.6 % (ref 0–1.5)
BILIRUB SERPL-MCNC: 0.7 MG/DL (ref 0–1.2)
BILIRUB UR QL STRIP: NEGATIVE
BUN SERPL-MCNC: 21 MG/DL (ref 6–20)
BUN/CREAT SERPL: 18.3 (ref 7–25)
CALCIUM SPEC-SCNC: 9.3 MG/DL (ref 8.6–10.5)
CHLORIDE SERPL-SCNC: 106 MMOL/L (ref 98–107)
CLARITY UR: CLEAR
CO2 SERPL-SCNC: 22 MMOL/L (ref 22–29)
COLOR UR: YELLOW
CREAT SERPL-MCNC: 1.15 MG/DL (ref 0.76–1.27)
D-LACTATE SERPL-SCNC: 0.9 MMOL/L (ref 0.5–2)
DEPRECATED RDW RBC AUTO: 45.4 FL (ref 37–54)
EGFRCR SERPLBLD CKD-EPI 2021: 74.7 ML/MIN/1.73
EOSINOPHIL # BLD AUTO: 0.32 10*3/MM3 (ref 0–0.4)
EOSINOPHIL NFR BLD AUTO: 3.3 % (ref 0.3–6.2)
ERYTHROCYTE [DISTWIDTH] IN BLOOD BY AUTOMATED COUNT: 13.3 % (ref 12.3–15.4)
GLOBULIN UR ELPH-MCNC: 3.1 GM/DL
GLUCOSE SERPL-MCNC: 110 MG/DL (ref 65–99)
GLUCOSE UR STRIP-MCNC: NEGATIVE MG/DL
HCT VFR BLD AUTO: 47.4 % (ref 37.5–51)
HGB BLD-MCNC: 16.1 G/DL (ref 13–17.7)
HGB UR QL STRIP.AUTO: NEGATIVE
HOLD SPECIMEN: NORMAL
HOLD SPECIMEN: NORMAL
IMM GRANULOCYTES # BLD AUTO: 0.03 10*3/MM3 (ref 0–0.05)
IMM GRANULOCYTES NFR BLD AUTO: 0.3 % (ref 0–0.5)
KETONES UR QL STRIP: NEGATIVE
LEUKOCYTE ESTERASE UR QL STRIP.AUTO: NEGATIVE
LIPASE SERPL-CCNC: 34 U/L (ref 13–60)
LYMPHOCYTES # BLD AUTO: 1.94 10*3/MM3 (ref 0.7–3.1)
LYMPHOCYTES NFR BLD AUTO: 20.1 % (ref 19.6–45.3)
MCH RBC QN AUTO: 31.7 PG (ref 26.6–33)
MCHC RBC AUTO-ENTMCNC: 34 G/DL (ref 31.5–35.7)
MCV RBC AUTO: 93.3 FL (ref 79–97)
MONOCYTES # BLD AUTO: 0.98 10*3/MM3 (ref 0.1–0.9)
MONOCYTES NFR BLD AUTO: 10.2 % (ref 5–12)
NEUTROPHILS NFR BLD AUTO: 6.3 10*3/MM3 (ref 1.7–7)
NEUTROPHILS NFR BLD AUTO: 65.5 % (ref 42.7–76)
NITRITE UR QL STRIP: NEGATIVE
NRBC BLD AUTO-RTO: 0 /100 WBC (ref 0–0.2)
PH UR STRIP.AUTO: 6 [PH] (ref 5–8)
PLATELET # BLD AUTO: 176 10*3/MM3 (ref 140–450)
PMV BLD AUTO: 11.4 FL (ref 6–12)
POTASSIUM SERPL-SCNC: 4.6 MMOL/L (ref 3.5–5.2)
PROT SERPL-MCNC: 7.5 G/DL (ref 6–8.5)
PROT UR QL STRIP: NEGATIVE
RBC # BLD AUTO: 5.08 10*6/MM3 (ref 4.14–5.8)
SODIUM SERPL-SCNC: 137 MMOL/L (ref 136–145)
SP GR UR STRIP: 1.02 (ref 1–1.03)
UROBILINOGEN UR QL STRIP: NORMAL
WBC NRBC COR # BLD: 9.63 10*3/MM3 (ref 3.4–10.8)
WHOLE BLOOD HOLD COAG: 11
WHOLE BLOOD HOLD SPECIMEN: NORMAL

## 2022-12-17 PROCEDURE — 83690 ASSAY OF LIPASE: CPT

## 2022-12-17 PROCEDURE — 96375 TX/PRO/DX INJ NEW DRUG ADDON: CPT

## 2022-12-17 PROCEDURE — 96374 THER/PROPH/DIAG INJ IV PUSH: CPT

## 2022-12-17 PROCEDURE — 0 GADOBENATE DIMEGLUMINE 529 MG/ML SOLUTION: Performed by: EMERGENCY MEDICINE

## 2022-12-17 PROCEDURE — 74177 CT ABD & PELVIS W/CONTRAST: CPT

## 2022-12-17 PROCEDURE — 85025 COMPLETE CBC W/AUTO DIFF WBC: CPT

## 2022-12-17 PROCEDURE — 81003 URINALYSIS AUTO W/O SCOPE: CPT

## 2022-12-17 PROCEDURE — A9577 INJ MULTIHANCE: HCPCS | Performed by: EMERGENCY MEDICINE

## 2022-12-17 PROCEDURE — 25010000002 KETOROLAC TROMETHAMINE PER 15 MG: Performed by: NURSE PRACTITIONER

## 2022-12-17 PROCEDURE — 0 IOPAMIDOL PER 1 ML: Performed by: EMERGENCY MEDICINE

## 2022-12-17 PROCEDURE — 25010000002 LORAZEPAM PER 2 MG: Performed by: EMERGENCY MEDICINE

## 2022-12-17 PROCEDURE — 83605 ASSAY OF LACTIC ACID: CPT

## 2022-12-17 PROCEDURE — 99283 EMERGENCY DEPT VISIT LOW MDM: CPT

## 2022-12-17 PROCEDURE — 74183 MRI ABD W/O CNTR FLWD CNTR: CPT

## 2022-12-17 PROCEDURE — 80053 COMPREHEN METABOLIC PANEL: CPT

## 2022-12-17 RX ORDER — KETOROLAC TROMETHAMINE 30 MG/ML
30 INJECTION, SOLUTION INTRAMUSCULAR; INTRAVENOUS ONCE
Status: COMPLETED | OUTPATIENT
Start: 2022-12-17 | End: 2022-12-17

## 2022-12-17 RX ORDER — KETOROLAC TROMETHAMINE 10 MG/1
10 TABLET, FILM COATED ORAL EVERY 6 HOURS PRN
Qty: 20 TABLET | Refills: 0 | Status: SHIPPED | OUTPATIENT
Start: 2022-12-17

## 2022-12-17 RX ORDER — CYCLOBENZAPRINE HCL 10 MG
10 TABLET ORAL 3 TIMES DAILY PRN
Qty: 30 TABLET | Refills: 0 | Status: SHIPPED | OUTPATIENT
Start: 2022-12-17

## 2022-12-17 RX ORDER — SODIUM CHLORIDE 0.9 % (FLUSH) 0.9 %
10 SYRINGE (ML) INJECTION AS NEEDED
Status: DISCONTINUED | OUTPATIENT
Start: 2022-12-17 | End: 2022-12-17 | Stop reason: HOSPADM

## 2022-12-17 RX ORDER — LORAZEPAM 2 MG/ML
1 INJECTION INTRAMUSCULAR ONCE
Status: COMPLETED | OUTPATIENT
Start: 2022-12-17 | End: 2022-12-17

## 2022-12-17 RX ADMIN — GADOBENATE DIMEGLUMINE 20 ML: 529 INJECTION, SOLUTION INTRAVENOUS at 13:44

## 2022-12-17 RX ADMIN — KETOROLAC TROMETHAMINE 30 MG: 30 INJECTION, SOLUTION INTRAMUSCULAR; INTRAVENOUS at 12:05

## 2022-12-17 RX ADMIN — LORAZEPAM 1 MG: 2 INJECTION INTRAMUSCULAR; INTRAVENOUS at 12:59

## 2022-12-17 RX ADMIN — IOPAMIDOL 100 ML: 755 INJECTION, SOLUTION INTRAVENOUS at 10:16

## 2022-12-17 NOTE — ED PROVIDER NOTES
Time: 8:51 AM EST  Chief Complaint:   Chief Complaint   Patient presents with   • Flank Pain           History of Present Illness:  Patient is a 57 y.o. year old male who presents to the emergency department with complaint of sharp right abdominal pain off and on for the past couple of weeks that seems to be getting worse.  Patient is on Brilinta due to a heart attack and stent placement.  He has had no nausea, vomiting, diarrhea and denies dark or bloody stools.          Patient Care Team  Primary Care Provider: Lonnie Barboza APRN    Past Medical History:     No Known Allergies  Past Medical History:   Diagnosis Date   • Coronary artery disease    • Hyperlipidemia    • Hypertension    • Limb swelling      Past Surgical History:   Procedure Laterality Date   • COLONOSCOPY  01/01/2016     Family History   Problem Relation Age of Onset   • Cancer Mother         Unspecified   • No Known Problems Father    • Diabetes Brother         Unspecified type       Home Medications:  Prior to Admission medications    Medication Sig Start Date End Date Taking? Authorizing Provider   Brilinta 60 MG tablet tablet TAKE 1 TABLET BY MOUTH TWICE A DAY 6/20/22  Yes Aaron Minaya MD   aspirin 81 MG chewable tablet Chew 81 mg Daily.    Emergency, Nurse Epic, RN   atorvastatin (LIPITOR) 40 MG tablet Take 1 tablet by mouth Every Night. 6/21/22   Aaron Minaya MD   losartan (Cozaar) 25 MG tablet Take 1 tablet by mouth Daily. 5/24/22   Isamar Martin APRN   metoprolol succinate XL (TOPROL-XL) 25 MG 24 hr tablet Take 1 tablet by mouth Daily. 6/21/22   Aaron Minaya MD   nitroglycerin (NITROSTAT) 0.4 MG SL tablet 1 under the tongue as needed for angina, may repeat q5mins for up three doses 5/24/22   Isamar Martin APRN        Social History:   Social History     Tobacco Use   • Smoking status: Heavy Smoker     Packs/day: 1.00     Years: 25.00     Pack years: 25.00     Types: Cigarettes   • Smokeless tobacco: Never  "  Substance Use Topics   • Alcohol use: Yes     Comment: current some day, occasionally   • Drug use: Never         Review of Systems:  Review of Systems   Constitutional: Negative for chills and fever.   HENT: Negative for congestion, ear pain, rhinorrhea and sore throat.    Eyes: Negative for pain.   Respiratory: Negative for cough and shortness of breath.    Cardiovascular: Negative for chest pain.   Gastrointestinal: Positive for abdominal pain. Negative for diarrhea, nausea and vomiting.   Genitourinary: Negative for decreased urine volume, dysuria and flank pain.   Musculoskeletal: Negative for arthralgias and myalgias.   Skin: Negative for rash.   Neurological: Negative for seizures and headaches.   All other systems reviewed and are negative.       Physical Exam:  /61   Pulse 54   Temp 97.9 °F (36.6 °C)   Resp 18   Ht 185.4 cm (73\")   Wt 116 kg (255 lb 4.7 oz)   SpO2 93%   BMI 33.68 kg/m²     Physical Exam  Vitals and nursing note reviewed.   Constitutional:       General: He is not in acute distress.     Appearance: Normal appearance. He is normal weight. He is not ill-appearing, toxic-appearing or diaphoretic.   HENT:      Head: Normocephalic and atraumatic.      Right Ear: External ear normal.      Left Ear: External ear normal.   Eyes:      General: No scleral icterus.     Conjunctiva/sclera: Conjunctivae normal.      Pupils: Pupils are equal, round, and reactive to light.   Cardiovascular:      Rate and Rhythm: Normal rate and regular rhythm.      Heart sounds: Normal heart sounds.   Pulmonary:      Effort: Pulmonary effort is normal. No respiratory distress.      Breath sounds: Normal breath sounds.   Abdominal:      General: Bowel sounds are normal. There is no distension.      Palpations: Abdomen is soft.      Tenderness: There is no abdominal tenderness.   Musculoskeletal:         General: No swelling, tenderness, deformity or signs of injury. Normal range of motion.      Cervical back: " Normal range of motion and neck supple.   Skin:     General: Skin is warm and dry.      Capillary Refill: Capillary refill takes less than 2 seconds.   Neurological:      General: No focal deficit present.      Mental Status: He is alert and oriented to person, place, and time.   Psychiatric:         Mood and Affect: Mood normal.         Behavior: Behavior normal.                Medications in the Emergency Department:  Medications   sodium chloride 0.9 % flush 10 mL (has no administration in time range)   iopamidol (ISOVUE-370) 76 % injection 100 mL (100 mL Intravenous Given 12/17/22 1016)   LORazepam (ATIVAN) injection 1 mg (1 mg Intravenous Given 12/17/22 1259)   ketorolac (TORADOL) injection 30 mg (30 mg Intravenous Given 12/17/22 1205)   gadobenate dimeglumine (MULTIHANCE) injection 20 mL (20 mL Intravenous Given 12/17/22 1344)        Labs  Lab Results (last 24 hours)     Procedure Component Value Units Date/Time    CBC & Differential [[id number]]  (Abnormal) Collected: 12/17/22 0752    Specimen: Blood Updated: 12/17/22 0800    Narrative:      The following orders were created for panel order CBC & Differential.  Procedure                               Abnormality         Status                     ---------                               -----------         ------                     CBC Auto Differential[[id number]]        Abnormal            Final result                 Please view results for these tests on the individual orders.    Comprehensive Metabolic Panel [[id number]]  (Abnormal) Collected: 12/17/22 0752    Specimen: Blood Updated: 12/17/22 0816     Glucose 110 mg/dL      BUN 21 mg/dL      Creatinine 1.15 mg/dL      Sodium 137 mmol/L      Potassium 4.6 mmol/L      Chloride 106 mmol/L      CO2 22.0 mmol/L      Calcium 9.3 mg/dL      Total Protein 7.5 g/dL      Albumin 4.40 g/dL      ALT (SGPT) 35 U/L      AST (SGOT) 25 U/L      Alkaline Phosphatase 81 U/L      Total Bilirubin 0.7 mg/dL      Globulin 3.1  gm/dL      A/G Ratio 1.4 g/dL      BUN/Creatinine Ratio 18.3     Anion Gap 9.0 mmol/L      eGFR 74.7 mL/min/1.73      Comment: National Kidney Foundation and American Society of Nephrology (ASN) Task Force recommended calculation based on the Chronic Kidney Disease Epidemiology Collaboration (CKD-EPI) equation refit without adjustment for race.       Narrative:      GFR Normal >60  Chronic Kidney Disease <60  Kidney Failure <15      Lipase [[id number]]  (Normal) Collected: 12/17/22 0752    Specimen: Blood Updated: 12/17/22 0816     Lipase 34 U/L     Lactic Acid, Plasma [[id number]]  (Normal) Collected: 12/17/22 0752    Specimen: Blood Updated: 12/17/22 0817     Lactate 0.9 mmol/L     CBC Auto Differential [[id number]]  (Abnormal) Collected: 12/17/22 0752    Specimen: Blood Updated: 12/17/22 0800     WBC 9.63 10*3/mm3      RBC 5.08 10*6/mm3      Hemoglobin 16.1 g/dL      Hematocrit 47.4 %      MCV 93.3 fL      MCH 31.7 pg      MCHC 34.0 g/dL      RDW 13.3 %      RDW-SD 45.4 fl      MPV 11.4 fL      Platelets 176 10*3/mm3      Neutrophil % 65.5 %      Lymphocyte % 20.1 %      Monocyte % 10.2 %      Eosinophil % 3.3 %      Basophil % 0.6 %      Immature Grans % 0.3 %      Neutrophils, Absolute 6.30 10*3/mm3      Lymphocytes, Absolute 1.94 10*3/mm3      Monocytes, Absolute 0.98 10*3/mm3      Eosinophils, Absolute 0.32 10*3/mm3      Basophils, Absolute 0.06 10*3/mm3      Immature Grans, Absolute 0.03 10*3/mm3      nRBC 0.0 /100 WBC     Urinalysis With Microscopic If Indicated (No Culture) - Urine, Clean Catch [[id number]]  (Normal) Collected: 12/17/22 0840    Specimen: Urine, Clean Catch Updated: 12/17/22 0848     Color, UA Yellow     Appearance, UA Clear     pH, UA 6.0     Specific Gravity, UA 1.025     Glucose, UA Negative     Ketones, UA Negative     Bilirubin, UA Negative     Blood, UA Negative     Protein, UA Negative     Leuk Esterase, UA Negative     Nitrite, UA Negative     Urobilinogen, UA 0.2 E.U./dL    Narrative:       Urine microscopic not indicated.           Imaging:  MRI Abdomen With & Without Contrast    Result Date: 12/17/2022  PROCEDURE: MRI ABDOMEN W WO CONTRAST  COMPARISON: [facility], CT, CT ABDOMEN PELVIS W CONTRAST, 12/17/2022, 10:14.  INDICATIONS: pancreatic lesion  CONTRAST: 20ml  Multihance I.V.  TECHNIQUE: Multiplanar images of the abdomen were obtained in a high field strength magnet before and after administration of IV contrast.  FINDINGS:  The liver is of normal size.  Mild fatty infiltration is evident.  No space-occupying lesion is seen.  The gallbladder is not abnormally distended.  No pancreatic lesion is evident.  The appearance on the CT scan is attributed to fat within the pancreas.  No abnormal patterns of enhancement are seen.  The spleen is of normal size.  Tiny renal cysts are of no clinical significance.        MR examination of the abdomen without and with IV contrast demonstrating no pancreatic abnormality.     FLY BEJARANO MD       Electronically Signed and Approved By: FLY BEJARANO MD on 12/17/2022 at 14:17             CT Abdomen Pelvis With Contrast    Result Date: 12/17/2022  PROCEDURE: CT ABDOMEN PELVIS W CONTRAST  COMPARISON: [facility], CT, CHEST W/ CONTRAST, 6/22/2015, 22:21.  INDICATIONS: Abdominal pain, acute, nonlocalized  TECHNIQUE: CT images were created with non-ionic intravenous contrast material.   PROTOCOL:   Standard imaging protocol performed    RADIATION:   DLP: 1177.4 mGy*cm   Automated exposure control was utilized to minimize radiation dose. CONTRAST: 100 cc Isovue 370 I.V. LABS:   eGFR: >60 ml/min/1.73m2  FINDINGS:  Mild opacity in the posterior lower lobes is probably chronic.  No dense consolidation or mass is evident.  The liver is of normal size.  The liver is of diffusely diminished density consistent with mild fatty infiltration.  The gallbladder is not abnormally distended.  No adrenal mass is seen.  7 mm hypodense lesion in the  pancreas seen on series 201, image 35 is nonspecific.  This is not evident on 6/22/2015.  MR examination of the abdomen without and with IV contrast is recommended.  The spleen is of normal size.  3 mm nonobstructing stone is seen in the midpole collecting system of the right kidney.  No ureteral stones are seen.  There is no evidence of hydronephrosis.  The urinary bladder is not abnormally distended.  The prostate gland measures 5 cm in transverse dimension.  Bowel loops are not abnormally dilated.  The appendix is normal.  Scattered sigmoid diverticula are evident.  Moderate degenerative spurring is seen in the lower thoracic and lumbar spine.  CONTINUED ON NEXT PAGE...          CT scan of the abdomen and pelvis with IV contrast demonstrating fatty liver.  7 mm hypodense lesion in the body of the pancreas.  MR examination of the abdomen without and with IV contrast is recommended.  3 mm nonobstructing stone is seen in the midpole right kidney.     FLY BEJARANO MD       Electronically Signed and Approved By: FLY BEJARANO MD on 12/17/2022 at 10:59               Procedures:  Procedures    Progress                            The patient was initially evaluated in the triage area where orders were placed. The patient was later dispositioned by JORGE Patterson.      The patient was advised to stay for completion of workup which includes but is not limited to communication of labs and radiological results, reassessment and plan. The patient was advised that leaving prior to disposition by a provider could result in critical findings that are not communicated to the patient.     Medical Decision Making:  MDM  Number of Diagnoses or Management Options  Abdominal pain of unknown cause: new and requires workup     Amount and/or Complexity of Data Reviewed  Clinical lab tests: reviewed  Tests in the radiology section of CPT®: reviewed and ordered    Risk of Complications, Morbidity, and/or Mortality  Presenting  problems: moderate  Diagnostic procedures: moderate  Management options: moderate             The following orders were placed after triage and evaluation:  Orders Placed This Encounter   Procedures   • CT Abdomen Pelvis With Contrast   • MRI Abdomen With & Without Contrast   • Fritch Draw   • Comprehensive Metabolic Panel   • Lipase   • Urinalysis With Microscopic If Indicated (No Culture) - Urine, Clean Catch   • Lactic Acid, Plasma   • CBC Auto Differential   • NPO Diet NPO Type: Strict NPO   • Undress & Gown   • Insert Peripheral IV   • CBC & Differential   • Green Top (Gel)   • Lavender Top   • Gold Top - SST   • Light Blue Top       Final diagnoses:   Abdominal pain of unknown cause          Disposition:  ED Disposition     ED Disposition   Discharge    Condition   Stable    Comment   --             This medical record created using voice recognition software.           Angelique Warren, APRN  12/17/22 1535

## 2023-01-16 ENCOUNTER — OFFICE VISIT (OUTPATIENT)
Dept: CARDIOLOGY | Facility: CLINIC | Age: 58
End: 2023-01-16
Payer: COMMERCIAL

## 2023-01-16 VITALS
SYSTOLIC BLOOD PRESSURE: 140 MMHG | HEIGHT: 73 IN | OXYGEN SATURATION: 98 % | DIASTOLIC BLOOD PRESSURE: 93 MMHG | BODY MASS INDEX: 33.27 KG/M2 | WEIGHT: 251 LBS | HEART RATE: 72 BPM

## 2023-01-16 DIAGNOSIS — F17.219 CIGARETTE NICOTINE DEPENDENCE WITH NICOTINE-INDUCED DISORDER: ICD-10-CM

## 2023-01-16 DIAGNOSIS — I25.10 CORONARY ARTERY DISEASE INVOLVING NATIVE CORONARY ARTERY OF NATIVE HEART WITHOUT ANGINA PECTORIS: Primary | ICD-10-CM

## 2023-01-16 DIAGNOSIS — E78.2 MIXED HYPERLIPIDEMIA: ICD-10-CM

## 2023-01-16 DIAGNOSIS — I10 HYPERTENSION, ESSENTIAL: Chronic | ICD-10-CM

## 2023-01-16 PROCEDURE — 99214 OFFICE O/P EST MOD 30 MIN: CPT | Performed by: INTERNAL MEDICINE

## 2023-01-16 RX ORDER — NICOTINE 21 MG/24HR
1 PATCH, TRANSDERMAL 24 HOURS TRANSDERMAL EVERY 24 HOURS
Qty: 14 PATCH | Refills: 0 | Status: SHIPPED | OUTPATIENT
Start: 2023-01-16

## 2023-01-16 RX ORDER — LOSARTAN POTASSIUM 50 MG/1
50 TABLET ORAL DAILY
Qty: 90 TABLET | Refills: 2 | Status: SHIPPED | OUTPATIENT
Start: 2023-01-16

## 2023-01-17 NOTE — ASSESSMENT & PLAN NOTE
LDL is 54, which is at goal.  Continue atorvastatin 40 mg nightly.  We will repeat lipid panel with the next lab draw.

## 2023-01-17 NOTE — ASSESSMENT & PLAN NOTE
She reports atypical chest pain which are not suggestive of angina.  Repeat Cardiac catheterization done in 2021 showed patent stents.  We will continue medical management.  Brilinta may be stopped in March of this year, after completing 3 years of dual antiplatelet therapy.  We will continue aspirin, statin and beta-blockers.  Counseled again regarding tobacco cessation.

## 2023-01-17 NOTE — ASSESSMENT & PLAN NOTE
Patient wants to quit tobacco use.  The harms of ongoing smoking including cardiovascular and pulmonary complications discussed.  Counseled regarding the benefits of quitting the use.  We will give nicotine patch prescription with a tapering dose.

## 2023-01-17 NOTE — PROGRESS NOTES
CARDIOLOGY FOLLOW-UP PROGRESS NOTE        Chief Complaint  Hyperlipidemia and Coronary Artery Disease (Follow-up)    Subjective            Demetrio Mantilla presents to [facility] CARDIOLOGY  History of Present Illness      Mr. Juan is here for routine 6-month follow-up visit for coronary artery disease and hyperlipidemia.  He reports intermittent mild chest discomfort lasting for few seconds.  These are not related to activities.  Denies any shortness of breath or dizziness.  Denies palpitations.  He is taking all the medications as prescribed.  His blood pressure was noted to be on the higher side during recent office visits.  He is trying to quit smoking and wants pharmacological interventions to decrease the craving.      Past History:     1) Coronary artery disease. Index presentation is acute myocardial infarction on 03/19/2020, status post angioplasty and stent placement to the right coronary artery; repeat Cardiac catheterization done on 5/21/2021 showed patent stent in RCA with no new lesions.  2) Negative for diabetes mellitus or hypertension.     Medical History:  Past Medical History:   Diagnosis Date   • Coronary artery disease    • Hyperlipidemia    • Hypertension    • Limb swelling        Surgical History: has a past surgical history that includes Colonoscopy (01/01/2016).     Family History: family history includes Cancer in his mother; Diabetes in his brother; No Known Problems in his father.     Social History: reports that he has been smoking cigarettes. He has a 25.00 pack-year smoking history. He has never used smokeless tobacco. He reports current alcohol use. He reports that he does not use drugs.    Allergies: Patient has no known allergies.    Current Outpatient Medications on File Prior to Visit   Medication Sig   • aspirin 81 MG chewable tablet Chew 81 mg Daily.   • atorvastatin (LIPITOR) 40 MG tablet Take 1 tablet by mouth Every Night.   • Brilinta 60 MG tablet  "tablet TAKE 1 TABLET BY MOUTH TWICE A DAY   • cyclobenzaprine (FLEXERIL) 10 MG tablet Take 1 tablet by mouth 3 (Three) Times a Day As Needed for Muscle Spasms.   • ketorolac (TORADOL) 10 MG tablet Take 1 tablet by mouth Every 6 (Six) Hours As Needed for Moderate Pain.   • metoprolol succinate XL (TOPROL-XL) 25 MG 24 hr tablet Take 1 tablet by mouth Daily.   • nitroglycerin (NITROSTAT) 0.4 MG SL tablet 1 under the tongue as needed for angina, may repeat q5mins for up three doses     No current facility-administered medications on file prior to visit.          Review of Systems   Respiratory: Negative for cough, shortness of breath and wheezing.    Cardiovascular: Positive for chest pain. Negative for palpitations and leg swelling.   Gastrointestinal: Negative for nausea and vomiting.   Neurological: Negative for dizziness and syncope.        Objective     /93   Pulse 72   Ht 185.4 cm (73\")   Wt 114 kg (251 lb)   SpO2 98%   BMI 33.12 kg/m²       Physical Exam    General : Alert, awake, no acute distress  Neck : Supple, no carotid bruit, no jugular venous distention  CVS : Regular rate and rhythm, no murmur, rubs or gallops  Lungs: Clear to auscultation bilaterally, no crackles or rhonchi  Abdomen: Soft, nontender, bowel sounds heard in all 4 quadrants  Extremities: Warm, well-perfused, no pedal edema    Result Review :     The following data was reviewed by: Aaron Minaya MD on 01/16/2023:    CMP    CMP 5/27/22 12/17/22   Glucose 99 110 (A)   BUN 19 21 (A)   Creatinine 1.12 1.15   eGFR 77.6 74.7   Sodium 140 137   Potassium 5.2 4.6   Chloride 108 (A) 106   Calcium 9.6 9.3   Total Protein 6.8 7.5   Albumin 4.20 4.40   Globulin 2.6 3.1   Total Bilirubin 0.7 0.7   Alkaline Phosphatase 70 81   AST (SGOT) 27 25   ALT (SGPT) 33 35   Albumin/Globulin Ratio 1.6 1.4   BUN/Creatinine Ratio 17.0 18.3   Anion Gap 7.8 9.0   (A) Abnormal value       Comments are available for some flowsheets but are not being " displayed.           CBC    CBC 5/27/22 12/17/22   WBC 8.40 9.63   RBC 4.80 5.08   Hemoglobin 15.4 16.1   Hematocrit 45.5 47.4   MCV 94.8 93.3   MCH 32.1 31.7   MCHC 33.8 34.0   RDW 13.4 13.3   Platelets 159 176             Lipid Panel    Lipid Panel 5/27/22   Total Cholesterol 99   Triglycerides 80   HDL Cholesterol 29 (A)   VLDL Cholesterol 16   LDL Cholesterol  54   LDL/HDL Ratio 1.86   (A) Abnormal value                 Data reviewed: Cardiology studies        Echocardiogram done on 2/10/2021 showed    1.  Overall LV ejection fraction is 55% with mild mid inferior wall hypokinesis.   2.  No hemodynamically significant valvular abnormalities.                  Assessment and Plan        Diagnoses and all orders for this visit:    1. Coronary artery disease involving native coronary artery of native heart without angina pectoris (Primary)  Assessment & Plan:  She reports atypical chest pain which are not suggestive of angina.  Repeat Cardiac catheterization done in 2021 showed patent stents.  We will continue medical management.  Brilinta may be stopped in March of this year, after completing 3 years of dual antiplatelet therapy.  We will continue aspirin, statin and beta-blockers.  Counseled again regarding tobacco cessation.      2. Hypertension, essential  Assessment & Plan:  Diastolic blood pressure on the higher side during recent multiple office visits.  We will increase the dose of losartan to 50 mg daily and continue metoprolol succinate at the current dose.  Repeat CMP will be done in 1 week after changing the dose of losartan.    Orders:  -     losartan (Cozaar) 50 MG tablet; Take 1 tablet by mouth Daily.  Dispense: 90 tablet; Refill: 2    3. Cigarette nicotine dependence with nicotine-induced disorder  Assessment & Plan:  Patient wants to quit tobacco use.  The harms of ongoing smoking including cardiovascular and pulmonary complications discussed.  Counseled regarding the benefits of quitting the use.   We will give nicotine patch prescription with a tapering dose.    Orders:  -     nicotine (NICODERM CQ) 14 MG/24HR patch; Place 1 patch on the skin as directed by provider Daily.  Dispense: 14 patch; Refill: 0  -     nicotine (NICODERM CQ) 7 MG/24HR patch; Place 1 patch on the skin as directed by provider Daily. Start taking 7 mg/24 hour patch after finishing 2 weeks of 14 mg/24 hour patches  Dispense: 7 patch; Refill: 2    4. Mixed hyperlipidemia  Assessment & Plan:  LDL is 54, which is at goal.  Continue atorvastatin 40 mg nightly.  We will repeat lipid panel with the next lab draw.    Orders:  -     Lipid Panel; Future  -     Comprehensive Metabolic Panel; Future            Follow Up     Return in about 9 months (around 10/16/2023) for Next scheduled follow up.    Patient was given instructions and counseling regarding his condition or for health maintenance advice. Please see specific information pulled into the AVS if appropriate.

## 2023-01-17 NOTE — ASSESSMENT & PLAN NOTE
Diastolic blood pressure on the higher side during recent multiple office visits.  We will increase the dose of losartan to 50 mg daily and continue metoprolol succinate at the current dose.  Repeat CMP will be done in 1 week after changing the dose of losartan.

## 2023-03-15 RX ORDER — METOPROLOL SUCCINATE 25 MG/1
TABLET, EXTENDED RELEASE ORAL
Qty: 90 TABLET | Refills: 2 | Status: SHIPPED | OUTPATIENT
Start: 2023-03-15

## 2023-03-15 RX ORDER — TICAGRELOR 60 MG/1
TABLET ORAL
Qty: 180 TABLET | Refills: 2 | OUTPATIENT
Start: 2023-03-15

## 2023-03-15 NOTE — TELEPHONE ENCOUNTER
Last OV: 01-16-23     Next  OV: 10-23-23    Medication matches last office note      Per last office note Brilinta to end in March 23

## 2023-04-27 RX ORDER — ATORVASTATIN CALCIUM 40 MG/1
TABLET, FILM COATED ORAL
Qty: 90 TABLET | Refills: 2 | Status: SHIPPED | OUTPATIENT
Start: 2023-04-27

## 2023-05-19 ENCOUNTER — APPOINTMENT (OUTPATIENT)
Dept: GENERAL RADIOLOGY | Facility: HOSPITAL | Age: 58
End: 2023-05-19
Payer: COMMERCIAL

## 2023-05-19 ENCOUNTER — HOSPITAL ENCOUNTER (EMERGENCY)
Facility: HOSPITAL | Age: 58
Discharge: HOME | End: 2023-05-19
Attending: EMERGENCY MEDICINE
Payer: COMMERCIAL

## 2023-05-19 VITALS
DIASTOLIC BLOOD PRESSURE: 81 MMHG | OXYGEN SATURATION: 95 % | WEIGHT: 234.79 LBS | HEIGHT: 75 IN | BODY MASS INDEX: 29.19 KG/M2 | RESPIRATION RATE: 16 BRPM | TEMPERATURE: 98.2 F | SYSTOLIC BLOOD PRESSURE: 138 MMHG | HEART RATE: 65 BPM
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
## 2023-05-19 DIAGNOSIS — S13.4XXA WHIPLASH INJURY TO NECK, INITIAL ENCOUNTER: Primary | ICD-10-CM

## 2023-05-19 PROCEDURE — 72050 X-RAY EXAM NECK SPINE 4/5VWS: CPT

## 2023-05-19 PROCEDURE — 99283 EMERGENCY DEPT VISIT LOW MDM: CPT

## 2023-05-19 PROCEDURE — 97110 THERAPEUTIC EXERCISES: CPT | Performed by: PHYSICAL THERAPIST

## 2023-05-19 PROCEDURE — 71101 X-RAY EXAM UNILAT RIBS/CHEST: CPT

## 2023-05-19 PROCEDURE — 97161 PT EVAL LOW COMPLEX 20 MIN: CPT | Performed by: PHYSICAL THERAPIST

## 2023-05-19 RX ORDER — CYCLOBENZAPRINE HCL 10 MG
10 TABLET ORAL ONCE
Status: COMPLETED | OUTPATIENT
Start: 2023-05-19 | End: 2023-05-19

## 2023-05-19 RX ORDER — IBUPROFEN 400 MG/1
800 TABLET ORAL ONCE
Status: COMPLETED | OUTPATIENT
Start: 2023-05-19 | End: 2023-05-19

## 2023-05-19 RX ADMIN — IBUPROFEN 800 MG: 400 TABLET, FILM COATED ORAL at 13:49

## 2023-05-19 RX ADMIN — CYCLOBENZAPRINE 10 MG: 10 TABLET, FILM COATED ORAL at 13:49
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
## 2023-07-26 ENCOUNTER — LAB (OUTPATIENT)
Dept: LAB | Facility: HOSPITAL | Age: 58
End: 2023-07-26
Payer: COMMERCIAL

## 2023-07-26 DIAGNOSIS — Z12.5 SCREENING FOR PROSTATE CANCER: ICD-10-CM

## 2023-07-26 DIAGNOSIS — E78.2 MIXED HYPERLIPIDEMIA: ICD-10-CM

## 2023-07-26 DIAGNOSIS — R10.11 RIGHT UPPER QUADRANT ABDOMINAL PAIN: ICD-10-CM

## 2023-07-26 DIAGNOSIS — I10 HYPERTENSION, ESSENTIAL: ICD-10-CM

## 2023-07-26 LAB
ALBUMIN SERPL-MCNC: 4.1 G/DL (ref 3.5–5.2)
ALBUMIN/GLOB SERPL: 1.5 G/DL
ALP SERPL-CCNC: 76 U/L (ref 39–117)
ALT SERPL W P-5'-P-CCNC: 32 U/L (ref 1–41)
AMYLASE SERPL-CCNC: 98 U/L (ref 28–100)
ANION GAP SERPL CALCULATED.3IONS-SCNC: 10.5 MMOL/L (ref 5–15)
AST SERPL-CCNC: 29 U/L (ref 1–40)
BILIRUB SERPL-MCNC: 0.7 MG/DL (ref 0–1.2)
BUN SERPL-MCNC: 20 MG/DL (ref 6–20)
BUN/CREAT SERPL: 18.5 (ref 7–25)
CALCIUM SPEC-SCNC: 9.3 MG/DL (ref 8.6–10.5)
CHLORIDE SERPL-SCNC: 109 MMOL/L (ref 98–107)
CHOLEST SERPL-MCNC: 143 MG/DL (ref 0–200)
CO2 SERPL-SCNC: 21.5 MMOL/L (ref 22–29)
CREAT SERPL-MCNC: 1.08 MG/DL (ref 0.76–1.27)
DEPRECATED RDW RBC AUTO: 43.8 FL (ref 37–54)
EGFRCR SERPLBLD CKD-EPI 2021: 80.5 ML/MIN/1.73
ERYTHROCYTE [DISTWIDTH] IN BLOOD BY AUTOMATED COUNT: 13 % (ref 12.3–15.4)
GLOBULIN UR ELPH-MCNC: 2.8 GM/DL
GLUCOSE SERPL-MCNC: 98 MG/DL (ref 65–99)
HCT VFR BLD AUTO: 48.2 % (ref 37.5–51)
HDLC SERPL-MCNC: 29 MG/DL (ref 40–60)
HGB BLD-MCNC: 16.6 G/DL (ref 13–17.7)
LDLC SERPL CALC-MCNC: 86 MG/DL (ref 0–100)
LDLC/HDLC SERPL: 2.84 {RATIO}
LIPASE SERPL-CCNC: 33 U/L (ref 13–60)
MCH RBC QN AUTO: 31.6 PG (ref 26.6–33)
MCHC RBC AUTO-ENTMCNC: 34.4 G/DL (ref 31.5–35.7)
MCV RBC AUTO: 91.8 FL (ref 79–97)
PLATELET # BLD AUTO: 142 10*3/MM3 (ref 140–450)
PMV BLD AUTO: 11.8 FL (ref 6–12)
POTASSIUM SERPL-SCNC: 4.6 MMOL/L (ref 3.5–5.2)
PROT SERPL-MCNC: 6.9 G/DL (ref 6–8.5)
PSA SERPL-MCNC: 3.42 NG/ML (ref 0–4)
RBC # BLD AUTO: 5.25 10*6/MM3 (ref 4.14–5.8)
SODIUM SERPL-SCNC: 141 MMOL/L (ref 136–145)
TRIGL SERPL-MCNC: 158 MG/DL (ref 0–150)
VLDLC SERPL-MCNC: 28 MG/DL (ref 5–40)
WBC NRBC COR # BLD: 7.77 10*3/MM3 (ref 3.4–10.8)

## 2023-07-26 PROCEDURE — 82150 ASSAY OF AMYLASE: CPT

## 2023-07-26 PROCEDURE — 80053 COMPREHEN METABOLIC PANEL: CPT

## 2023-07-26 PROCEDURE — 85027 COMPLETE CBC AUTOMATED: CPT

## 2023-07-26 PROCEDURE — G0103 PSA SCREENING: HCPCS

## 2023-07-26 PROCEDURE — 83690 ASSAY OF LIPASE: CPT

## 2023-07-26 PROCEDURE — 36415 COLL VENOUS BLD VENIPUNCTURE: CPT

## 2023-07-26 PROCEDURE — 80061 LIPID PANEL: CPT

## 2023-10-19 DIAGNOSIS — I10 HYPERTENSION, ESSENTIAL: Chronic | ICD-10-CM

## 2023-10-19 RX ORDER — METOPROLOL SUCCINATE 25 MG/1
TABLET, EXTENDED RELEASE ORAL
Qty: 90 TABLET | Refills: 0 | Status: SHIPPED | OUTPATIENT
Start: 2023-10-19 | End: 2023-10-23 | Stop reason: SDUPTHER

## 2023-10-19 RX ORDER — LOSARTAN POTASSIUM 50 MG/1
50 TABLET ORAL DAILY
Qty: 90 TABLET | Refills: 0 | Status: SHIPPED | OUTPATIENT
Start: 2023-10-19 | End: 2023-10-23 | Stop reason: SDUPTHER

## 2023-10-23 ENCOUNTER — OFFICE VISIT (OUTPATIENT)
Dept: CARDIOLOGY | Facility: CLINIC | Age: 58
End: 2023-10-23
Payer: COMMERCIAL

## 2023-10-23 VITALS
BODY MASS INDEX: 29.84 KG/M2 | DIASTOLIC BLOOD PRESSURE: 89 MMHG | HEART RATE: 60 BPM | WEIGHT: 240 LBS | SYSTOLIC BLOOD PRESSURE: 150 MMHG | HEIGHT: 75 IN

## 2023-10-23 DIAGNOSIS — I10 HYPERTENSION, ESSENTIAL: Chronic | ICD-10-CM

## 2023-10-23 DIAGNOSIS — I25.119 CORONARY ARTERY DISEASE INVOLVING NATIVE CORONARY ARTERY OF NATIVE HEART WITH ANGINA PECTORIS: Primary | ICD-10-CM

## 2023-10-23 DIAGNOSIS — E78.2 MIXED HYPERLIPIDEMIA: ICD-10-CM

## 2023-10-23 PROCEDURE — 99214 OFFICE O/P EST MOD 30 MIN: CPT | Performed by: INTERNAL MEDICINE

## 2023-10-23 RX ORDER — METOPROLOL SUCCINATE 25 MG/1
25 TABLET, EXTENDED RELEASE ORAL DAILY
Qty: 90 TABLET | Refills: 3 | Status: SHIPPED | OUTPATIENT
Start: 2023-10-23

## 2023-10-23 RX ORDER — LOSARTAN POTASSIUM 50 MG/1
50 TABLET ORAL DAILY
Qty: 90 TABLET | Refills: 3 | Status: SHIPPED | OUTPATIENT
Start: 2023-10-23

## 2023-10-23 RX ORDER — ATORVASTATIN CALCIUM 40 MG/1
40 TABLET, FILM COATED ORAL
Qty: 90 TABLET | Refills: 3 | Status: SHIPPED | OUTPATIENT
Start: 2023-10-23

## 2023-10-23 RX ORDER — ETODOLAC 500 MG/1
500 TABLET, FILM COATED ORAL 2 TIMES DAILY
COMMUNITY

## 2023-10-23 RX ORDER — NITROGLYCERIN 0.4 MG/1
TABLET SUBLINGUAL
Qty: 25 TABLET | Refills: 2 | Status: SHIPPED | OUTPATIENT
Start: 2023-10-23

## 2023-10-23 NOTE — ASSESSMENT & PLAN NOTE
Most recent LDL is 86, which is above goal.  Previously better controlled.  We will continue atorvastatin 40 mg nightly.  Counseled regarding dietary changes and increasing exercise.  We will repeat lipid panel before next visit.

## 2023-10-23 NOTE — PROGRESS NOTES
CARDIOLOGY FOLLOW-UP PROGRESS NOTE        Chief Complaint  Follow-up and Coronary Artery Disease    Subjective            Demetrio Mantilla presents to [facility] CARDIOLOGY  History of Present Illness    Ms Mantilla is here for a routine 9-month follow-up visit.  He continues to report intermittent mild chest discomfort lasting for few seconds.  These are not related to activities.  Symptoms have not changed for the past several years.  He denies any palpitations, dizziness or shortness of breath.  He stopped taking Brilinta in March of this year as advised earlier.  He continues to smoke and currently smoking 1-1/2 pack of cigarettes per day.    Past History:     1) Coronary artery disease. Index presentation is acute myocardial infarction on 03/19/2020, status post angioplasty and stent placement to the right coronary artery; repeat Cardiac catheterization done on 5/21/2021 showed patent stent in RCA with no new lesions.  2) Negative for diabetes mellitus or hypertension.     Medical History:  Past Medical History:   Diagnosis Date    Coronary artery disease     Diabetes mellitus     Hyperlipidemia     Hypertension     Limb swelling     Myocardial infarction        Surgical History: has a past surgical history that includes Colonoscopy (01/01/2016).     Family History: family history includes Cancer in his mother; Diabetes in his brother; No Known Problems in his father.     Social History: reports that he has been smoking cigarettes. He has a 37.50 pack-year smoking history. He has never used smokeless tobacco. He reports current alcohol use. He reports that he does not use drugs.    Allergies: Patient has no known allergies.    Current Outpatient Medications on File Prior to Visit   Medication Sig    aspirin 81 MG chewable tablet Chew 1 tablet Daily.    etodolac (LODINE) 500 MG tablet Take 1 tablet by mouth 2 (Two) Times a Day.    atorvastatin (LIPITOR) 40 MG tablet TAKE 1 TABLET BY MOUTH  "EVERY DAY AT NIGHT    losartan (COZAAR) 50 MG tablet TAKE 1 TABLET BY MOUTH EVERY DAY    metoprolol succinate XL (TOPROL-XL) 25 MG 24 hr tablet TAKE 1 TABLET BY MOUTH EVERY DAY    nitroglycerin (NITROSTAT) 0.4 MG SL tablet 1 under the tongue as needed for angina, may repeat q5mins for up three doses       Review of Systems   Respiratory:  Positive for chest tightness. Negative for cough, shortness of breath and wheezing.    Cardiovascular:  Negative for chest pain, palpitations and leg swelling.   Gastrointestinal:  Negative for nausea and vomiting.   Neurological:  Negative for dizziness and syncope.        Objective     /89   Pulse 60   Ht 190.5 cm (75\")   Wt 109 kg (240 lb)   BMI 30.00 kg/m²       Physical Exam    General : Alert, awake, no acute distress  Neck : Supple, no carotid bruit, no jugular venous distention  CVS : Regular rate and rhythm, no murmur, rubs or gallops  Lungs: Clear to auscultation bilaterally, no crackles or rhonchi  Abdomen: Soft, nontender, bowel sounds heard in all 4 quadrants  Extremities: Warm, well-perfused, no pedal edema    Result Review :     The following data was reviewed by: Aaron Minaya MD on 10/23/2023:    CMP          12/17/2022    07:52 7/26/2023    09:48   CMP   Glucose 110  98    BUN 21  20    Creatinine 1.15  1.08    EGFR 74.7  80.5    Sodium 137  141    Potassium 4.6  4.6    Chloride 106  109    Calcium 9.3  9.3    Total Protein 7.5  6.9    Albumin 4.40  4.1    Globulin 3.1  2.8    Total Bilirubin 0.7  0.7    Alkaline Phosphatase 81  76    AST (SGOT) 25  29    ALT (SGPT) 35  32    Albumin/Globulin Ratio 1.4  1.5    BUN/Creatinine Ratio 18.3  18.5    Anion Gap 9.0  10.5      CBC          12/17/2022    07:52 7/26/2023    09:48   CBC   WBC 9.63  7.77    RBC 5.08  5.25    Hemoglobin 16.1  16.6    Hematocrit 47.4  48.2    MCV 93.3  91.8    MCH 31.7  31.6    MCHC 34.0  34.4    RDW 13.3  13.0    Platelets 176  142        Lipid Panel          7/26/2023    09:48 "   Lipid Panel   Total Cholesterol 143    Triglycerides 158    HDL Cholesterol 29    VLDL Cholesterol 28    LDL Cholesterol  86    LDL/HDL Ratio 2.84           Data reviewed: Cardiology studies        Cardiac cath done on 5/20/2021 showed    1. Patent stent noted in the proximal to mid right coronary artery with no    in-stent re-stenosis.    2. Non-obstructive coronary artery disease noted in diagonal 1 branch, which    is unchanged from previous angiograms in 2020.    3. Overall LV ejection fraction is 55% with mild mid inferior wall    hypokinesis.    4. There are no culprit lesions identified for patient presentation of    unstable angina indicating that the symptoms are likely non-cardiac.                   Assessment and Plan        Diagnoses and all orders for this visit:    1. Coronary artery disease involving native coronary artery of native heart with angina pectoris (Primary)  Assessment & Plan:  He reports intermittent episodes of chest discomfort which are not very typical of angina.  Symptoms are unchanged for the past several years.  A repeat cardiac cath done in 2021 showed patent stent and no new lesions.  We will hold off on doing any further work-up at this time.  Aspirin, statin, beta-blocker at the current dose.  Long-acting nitrates may be added as next step for ongoing or recurrent symptoms.    Orders:  -     nitroglycerin (NITROSTAT) 0.4 MG SL tablet; 1 under the tongue as needed for angina, may repeat q5mins for up three doses  Dispense: 25 tablet; Refill: 2    2. Hypertension, essential  Assessment & Plan:  Blood pressure is more elevated in the office today, better controlled during previous visits.  Counseled regarding low-sodium diet.  Encouraged to keep home blood pressure log.    Orders:  -     losartan (COZAAR) 50 MG tablet; Take 1 tablet by mouth Daily.  Dispense: 90 tablet; Refill: 3  -     metoprolol succinate XL (TOPROL-XL) 25 MG 24 hr tablet; Take 1 tablet by mouth Daily.   Dispense: 90 tablet; Refill: 3    3. Mixed hyperlipidemia  Assessment & Plan:  Most recent LDL is 86, which is above goal.  Previously better controlled.  We will continue atorvastatin 40 mg nightly.  Counseled regarding dietary changes and increasing exercise.  We will repeat lipid panel before next visit.    Orders:  -     atorvastatin (LIPITOR) 40 MG tablet; Take 1 tablet by mouth every night at bedtime.  Dispense: 90 tablet; Refill: 3              Follow Up     Return in about 9 months (around 7/23/2024) for Next scheduled follow up.    Patient was given instructions and counseling regarding his condition or for health maintenance advice. Please see specific information pulled into the AVS if appropriate.

## 2023-10-23 NOTE — ASSESSMENT & PLAN NOTE
He reports intermittent episodes of chest discomfort which are not very typical of angina.  Symptoms are unchanged for the past several years.  A repeat cardiac cath done in 2021 showed patent stent and no new lesions.  We will hold off on doing any further work-up at this time.  Aspirin, statin, beta-blocker at the current dose.  Long-acting nitrates may be added as next step for ongoing or recurrent symptoms.

## 2023-10-23 NOTE — ASSESSMENT & PLAN NOTE
Blood pressure is more elevated in the office today, better controlled during previous visits.  Counseled regarding low-sodium diet.  Encouraged to keep home blood pressure log.

## 2023-11-26 ENCOUNTER — HOSPITAL ENCOUNTER (EMERGENCY)
Facility: HOSPITAL | Age: 58
Discharge: HOME | End: 2023-11-26
Attending: EMERGENCY MEDICINE | Admitting: EMERGENCY MEDICINE
Payer: COMMERCIAL

## 2023-11-26 VITALS
DIASTOLIC BLOOD PRESSURE: 98 MMHG | BODY MASS INDEX: 30.29 KG/M2 | WEIGHT: 243.61 LBS | OXYGEN SATURATION: 99 % | TEMPERATURE: 98.1 F | HEART RATE: 77 BPM | HEIGHT: 75 IN | RESPIRATION RATE: 16 BRPM | SYSTOLIC BLOOD PRESSURE: 160 MMHG

## 2023-11-26 DIAGNOSIS — I10 HYPERTENSION, UNSPECIFIED TYPE: Primary | ICD-10-CM

## 2023-11-26 LAB
ALBUMIN SERPL-MCNC: 4.2 G/DL (ref 3.5–5.2)
ALBUMIN/GLOB SERPL: 1.4 G/DL
ALP SERPL-CCNC: 75 U/L (ref 39–117)
ALT SERPL W P-5'-P-CCNC: 30 U/L (ref 1–41)
ANION GAP SERPL CALCULATED.3IONS-SCNC: 11 MMOL/L (ref 5–15)
AST SERPL-CCNC: 22 U/L (ref 1–40)
BACTERIA UR QL AUTO: ABNORMAL /HPF
BASOPHILS # BLD AUTO: 0.07 10*3/MM3 (ref 0–0.2)
BASOPHILS NFR BLD AUTO: 0.7 % (ref 0–1.5)
BILIRUB SERPL-MCNC: 0.7 MG/DL (ref 0–1.2)
BILIRUB UR QL STRIP: NEGATIVE
BUN SERPL-MCNC: 22 MG/DL (ref 6–20)
BUN/CREAT SERPL: 17.3 (ref 7–25)
CALCIUM SPEC-SCNC: 9.5 MG/DL (ref 8.6–10.5)
CHLORIDE SERPL-SCNC: 107 MMOL/L (ref 98–107)
CLARITY UR: ABNORMAL
CO2 SERPL-SCNC: 25 MMOL/L (ref 22–29)
COLOR UR: YELLOW
CREAT SERPL-MCNC: 1.27 MG/DL (ref 0.76–1.27)
DEPRECATED RDW RBC AUTO: 48.4 FL (ref 37–54)
EGFRCR SERPLBLD CKD-EPI 2021: 65.9 ML/MIN/1.73
EOSINOPHIL # BLD AUTO: 0.31 10*3/MM3 (ref 0–0.4)
EOSINOPHIL NFR BLD AUTO: 3.2 % (ref 0.3–6.2)
ERYTHROCYTE [DISTWIDTH] IN BLOOD BY AUTOMATED COUNT: 13.4 % (ref 12.3–15.4)
GLOBULIN UR ELPH-MCNC: 3 GM/DL
GLUCOSE SERPL-MCNC: 120 MG/DL (ref 65–99)
GLUCOSE UR STRIP-MCNC: NEGATIVE MG/DL
HCT VFR BLD AUTO: 47.2 % (ref 37.5–51)
HGB BLD-MCNC: 15.4 G/DL (ref 13–17.7)
HGB UR QL STRIP.AUTO: NEGATIVE
HYALINE CASTS UR QL AUTO: ABNORMAL /LPF
IMM GRANULOCYTES # BLD AUTO: 0.06 10*3/MM3 (ref 0–0.05)
IMM GRANULOCYTES NFR BLD AUTO: 0.6 % (ref 0–0.5)
KETONES UR QL STRIP: ABNORMAL
LEUKOCYTE ESTERASE UR QL STRIP.AUTO: ABNORMAL
LYMPHOCYTES # BLD AUTO: 2.22 10*3/MM3 (ref 0.7–3.1)
LYMPHOCYTES NFR BLD AUTO: 22.9 % (ref 19.6–45.3)
MAGNESIUM SERPL-MCNC: 1.8 MG/DL (ref 1.6–2.6)
MCH RBC QN AUTO: 31.5 PG (ref 26.6–33)
MCHC RBC AUTO-ENTMCNC: 32.6 G/DL (ref 31.5–35.7)
MCV RBC AUTO: 96.5 FL (ref 79–97)
MONOCYTES # BLD AUTO: 0.63 10*3/MM3 (ref 0.1–0.9)
MONOCYTES NFR BLD AUTO: 6.5 % (ref 5–12)
NEUTROPHILS NFR BLD AUTO: 6.41 10*3/MM3 (ref 1.7–7)
NEUTROPHILS NFR BLD AUTO: 66.1 % (ref 42.7–76)
NITRITE UR QL STRIP: NEGATIVE
NRBC BLD AUTO-RTO: 0 /100 WBC (ref 0–0.2)
PH UR STRIP.AUTO: 8 [PH] (ref 5–8)
PLATELET # BLD AUTO: 143 10*3/MM3 (ref 140–450)
PMV BLD AUTO: 11.8 FL (ref 6–12)
POTASSIUM SERPL-SCNC: 4.1 MMOL/L (ref 3.5–5.2)
PROT SERPL-MCNC: 7.2 G/DL (ref 6–8.5)
PROT UR QL STRIP: NEGATIVE
RBC # BLD AUTO: 4.89 10*6/MM3 (ref 4.14–5.8)
RBC # UR STRIP: ABNORMAL /HPF
REF LAB TEST METHOD: ABNORMAL
SODIUM SERPL-SCNC: 143 MMOL/L (ref 136–145)
SP GR UR STRIP: 1.02 (ref 1–1.03)
SQUAMOUS #/AREA URNS HPF: ABNORMAL /HPF
UROBILINOGEN UR QL STRIP: ABNORMAL
WBC # UR STRIP: ABNORMAL /HPF
WBC NRBC COR # BLD AUTO: 9.7 10*3/MM3 (ref 3.4–10.8)

## 2023-11-26 PROCEDURE — 85025 COMPLETE CBC W/AUTO DIFF WBC: CPT | Performed by: EMERGENCY MEDICINE

## 2023-11-26 PROCEDURE — 81001 URINALYSIS AUTO W/SCOPE: CPT | Performed by: EMERGENCY MEDICINE

## 2023-11-26 PROCEDURE — 83735 ASSAY OF MAGNESIUM: CPT

## 2023-11-26 PROCEDURE — 80053 COMPREHEN METABOLIC PANEL: CPT | Performed by: EMERGENCY MEDICINE

## 2023-11-26 PROCEDURE — 36415 COLL VENOUS BLD VENIPUNCTURE: CPT

## 2023-11-26 PROCEDURE — 99283 EMERGENCY DEPT VISIT LOW MDM: CPT

## 2023-11-26 PROCEDURE — 93005 ELECTROCARDIOGRAM TRACING: CPT | Performed by: EMERGENCY MEDICINE

## 2023-11-27 LAB
QT INTERVAL: 394 MS
QTC INTERVAL: 396 MS

## 2023-11-27 NOTE — ED PROVIDER NOTES
Time: 7:47 PM EST  Date of encounter:  11/26/2023  Independent Historian/Clinical History and Information was obtained by:   Patient    History is limited by: N/A    Chief Complaint   Patient presents with    Hypertension         History of Present Illness:  Patient is a 58 y.o. year old male who presents to the emergency department for evaluation of hypertension.  Has been checking all day and systolic was called to 190.  Denies any change in vision, headache, chest pain, shortness of breath.  No recent changes in blood pressure medications and states that he is compliant with blood pressure medications.  No known diagnosis of sleep apnea.  Concerned because last time he started feeling this way he had a heart attack the next day. No recent steroids. (Buck Locke PA-C provider in triage 7:47 PM EST )     Patient Care Team  Primary Care Provider: Lonnie Barboza APRN    Past Medical History:     No Known Allergies  Past Medical History:   Diagnosis Date    Coronary artery disease     Diabetes mellitus     Hyperlipidemia     Hypertension     Limb swelling     Myocardial infarction      Past Surgical History:   Procedure Laterality Date    COLONOSCOPY  01/01/2016     Family History   Problem Relation Age of Onset    Cancer Mother         Unspecified    No Known Problems Father     Diabetes Brother         Unspecified type       Home Medications:  Prior to Admission medications    Medication Sig Start Date End Date Taking? Authorizing Provider   aspirin 81 MG chewable tablet Chew 1 tablet Daily.    Emergency, Nurse Epic, RN   atorvastatin (LIPITOR) 40 MG tablet Take 1 tablet by mouth every night at bedtime. 10/23/23   Aaron Minaya MD   etodolac (LODINE) 500 MG tablet Take 1 tablet by mouth 2 (Two) Times a Day.    Provider, MD Steve   losartan (COZAAR) 50 MG tablet Take 1 tablet by mouth Daily. 10/23/23   Aaron Minaya MD   metoprolol succinate XL (TOPROL-XL) 25 MG 24 hr tablet Take 1 tablet by mouth  "Daily. 10/23/23   Aaron Minaya MD   nitroglycerin (NITROSTAT) 0.4 MG SL tablet 1 under the tongue as needed for angina, may repeat q5mins for up three doses 10/23/23   Aaron Minaya MD        Social History:   Social History     Tobacco Use    Smoking status: Heavy Smoker     Packs/day: 1.50     Years: 25.00     Additional pack years: 0.00     Total pack years: 37.50     Types: Cigarettes    Smokeless tobacco: Never   Vaping Use    Vaping Use: Never used   Substance Use Topics    Alcohol use: Yes     Comment: socially    Drug use: Never         Review of Systems:  Review of Systems   Constitutional:  Negative for chills and fever.   HENT:  Negative for congestion, ear pain and sore throat.    Eyes:  Negative for pain and visual disturbance.   Respiratory:  Negative for cough, chest tightness and shortness of breath.    Cardiovascular:  Negative for chest pain and palpitations.   Gastrointestinal:  Negative for abdominal pain, diarrhea, nausea and vomiting.   Genitourinary:  Negative for flank pain and hematuria.   Musculoskeletal:  Negative for joint swelling.   Skin:  Negative for pallor.   Neurological:  Negative for dizziness, seizures, light-headedness and headaches.   All other systems reviewed and are negative.       Physical Exam:  /98 (BP Location: Right arm, Patient Position: Sitting)   Pulse 77   Temp 98.1 °F (36.7 °C) (Oral)   Resp 16   Ht 190.5 cm (75\")   Wt 110 kg (243 lb 9.7 oz)   SpO2 99%   BMI 30.45 kg/m²         Physical Exam  Vitals and nursing note reviewed.   Constitutional:       General: He is not in acute distress.     Appearance: Normal appearance. He is not toxic-appearing.   HENT:      Head: Normocephalic and atraumatic.      Mouth/Throat:      Mouth: Mucous membranes are moist.   Eyes:      General: No scleral icterus.     Extraocular Movements: Extraocular movements intact.      Conjunctiva/sclera: Conjunctivae normal.   Cardiovascular:      Rate and Rhythm: Normal " rate and regular rhythm.      Pulses: Normal pulses.      Heart sounds: Normal heart sounds.   Pulmonary:      Effort: Pulmonary effort is normal. No respiratory distress.      Breath sounds: Normal breath sounds. No wheezing.   Abdominal:      General: Abdomen is flat. There is no distension.      Palpations: Abdomen is soft.      Tenderness: There is no abdominal tenderness.   Musculoskeletal:         General: Normal range of motion.      Cervical back: Normal range of motion and neck supple.   Skin:     General: Skin is warm and dry.      Coloration: Skin is not cyanotic.   Neurological:      Mental Status: He is alert and oriented to person, place, and time. Mental status is at baseline.   Psychiatric:         Attention and Perception: Attention and perception normal.         Mood and Affect: Mood normal.         Judgment: Judgment normal.                   Procedures:  Procedures      Medical Decision Making:      Comorbidities that affect care:    Hypertension, hyperlipidemia, diabetes, coronary artery disease    External Notes reviewed:    Previous Clinic Note: 10/23/2023 cardiologist follow-up of hypertension, hyperlipidemia, coronary artery disease      The following orders were placed and all results were independently analyzed by me:  Orders Placed This Encounter   Procedures    Comprehensive Metabolic Panel    Urinalysis With Microscopic If Indicated (No Culture) - Urine, Clean Catch    CBC Auto Differential    Urinalysis, Microscopic Only - Urine, Clean Catch    Magnesium    Manual bp  Misc Nursing Order (Specify)    ECG 12 Lead Other; hypertension    CBC & Differential       Medications Given in the Emergency Department:  Medications - No data to display     ED Course:    The patient was initially evaluated in the triage area where orders were placed. The patient was later dispositioned by JORGE Tejada.      The patient was advised to stay for completion of workup which includes but is not  limited to communication of labs and radiological results, reassessment and plan. The patient was advised that leaving prior to disposition by a provider could result in critical findings that are not communicated to the patient.          Labs:    Lab Results (last 24 hours)       Procedure Component Value Units Date/Time    CBC & Differential [[id number]]  (Abnormal) Collected: 11/26/23 1948    Specimen: Blood Updated: 11/26/23 2002    Narrative:      The following orders were created for panel order CBC & Differential.  Procedure                               Abnormality         Status                     ---------                               -----------         ------                     CBC Auto Differential[[id number]]        Abnormal            Final result                 Please view results for these tests on the individual orders.    Comprehensive Metabolic Panel [[id number]]  (Abnormal) Collected: 11/26/23 1948    Specimen: Blood Updated: 11/26/23 2038     Glucose 120 mg/dL      BUN 22 mg/dL      Creatinine 1.27 mg/dL      Sodium 143 mmol/L      Potassium 4.1 mmol/L      Comment: Slight hemolysis detected by analyzer. Result may be falsely elevated.        Chloride 107 mmol/L      CO2 25.0 mmol/L      Calcium 9.5 mg/dL      Total Protein 7.2 g/dL      Albumin 4.2 g/dL      ALT (SGPT) 30 U/L      AST (SGOT) 22 U/L      Alkaline Phosphatase 75 U/L      Total Bilirubin 0.7 mg/dL      Globulin 3.0 gm/dL      A/G Ratio 1.4 g/dL      BUN/Creatinine Ratio 17.3     Anion Gap 11.0 mmol/L      eGFR 65.9 mL/min/1.73     Narrative:      GFR Normal >60  Chronic Kidney Disease <60  Kidney Failure <15      CBC Auto Differential [[id number]]  (Abnormal) Collected: 11/26/23 1948    Specimen: Blood Updated: 11/26/23 2002     WBC 9.70 10*3/mm3      RBC 4.89 10*6/mm3      Hemoglobin 15.4 g/dL      Hematocrit 47.2 %      MCV 96.5 fL      MCH 31.5 pg      MCHC 32.6 g/dL      RDW 13.4 %      RDW-SD 48.4 fl      MPV 11.8 fL       Platelets 143 10*3/mm3      Neutrophil % 66.1 %      Lymphocyte % 22.9 %      Monocyte % 6.5 %      Eosinophil % 3.2 %      Basophil % 0.7 %      Immature Grans % 0.6 %      Neutrophils, Absolute 6.41 10*3/mm3      Lymphocytes, Absolute 2.22 10*3/mm3      Monocytes, Absolute 0.63 10*3/mm3      Eosinophils, Absolute 0.31 10*3/mm3      Basophils, Absolute 0.07 10*3/mm3      Immature Grans, Absolute 0.06 10*3/mm3      nRBC 0.0 /100 WBC     Magnesium [[id number]]  (Normal) Collected: 11/26/23 1948    Specimen: Blood Updated: 11/26/23 2119     Magnesium 1.8 mg/dL     Urinalysis With Microscopic If Indicated (No Culture) - Urine, Clean Catch [[id number]]  (Abnormal) Collected: 11/26/23 2050    Specimen: Urine, Clean Catch Updated: 11/26/23 2106     Color, UA Yellow     Appearance, UA Cloudy     pH, UA 8.0     Specific Gravity, UA 1.022     Glucose, UA Negative     Ketones, UA Trace     Bilirubin, UA Negative     Blood, UA Negative     Protein, UA Negative     Leuk Esterase, UA Trace     Nitrite, UA Negative     Urobilinogen, UA 1.0 E.U./dL    Urinalysis, Microscopic Only - Urine, Clean Catch [[id number]]  (Abnormal) Collected: 11/26/23 2050    Specimen: Urine, Clean Catch Updated: 11/26/23 2106     RBC, UA 0-2 /HPF      WBC, UA 3-5 /HPF      Bacteria, UA None Seen /HPF      Squamous Epithelial Cells, UA 0-2 /HPF      Hyaline Casts, UA None Seen /LPF      Methodology Automated Microscopy             Imaging:    No Radiology Exams Resulted Within Past 24 Hours      Differential Diagnosis and Discussion:      Metabolic: Differential diagnosis includes but is not limited to hypertension, hyperglycemia, hyperkalemia, hypocalcemia, metabolic acidosis, hypokalemia, hypoglycemia, malnutrition, hypothyroidism, hyperthyroidism, and adrenal insufficiency.     All labs were reviewed and interpreted by me.  EKG was interpreted by me.    MDM     Amount and/or Complexity of Data Reviewed  Clinical lab tests: reviewed  Tests in the medicine  section of CPT®: reviewed    Risk of Complications, Morbidity, and/or Mortality  Presenting problems: moderate  Diagnostic procedures: moderate  Management options: moderate         Patient Care Considerations:    CT HEAD: I considered ordering a noncontrast CT of the head, however not indicated patient is not having a headache and is not vomiting.      Consultants/Shared Management Plan:    None    Social Determinants of Health:    Patient is independent, reliable, and has access to care.       Disposition and Care Coordination:    Discharged: The patient is suitable and stable for discharge with no need for consideration of observation or admission.    [unfilled]  I have explained discharge medications and the need for follow up with the patient/caretakers. This was also printed in the discharge instructions. Patient was discharged with the following medications and follow up:      Medication List      No changes were made to your prescriptions during this visit.      Lonnie Barboza, APRN  [address] [facility] [address]  [phone]    Schedule an appointment as soon as possible for a visit   As needed       Final diagnoses:   Hypertension, unspecified type        ED Disposition       ED Disposition   Discharge    Condition   Stable    Comment   --               This medical record created using voice recognition software.             Latoya Moon, APRN  11/26/23 [id number]

## 2024-03-06 ENCOUNTER — OFFICE VISIT (OUTPATIENT)
Dept: FAMILY MEDICINE CLINIC | Facility: CLINIC | Age: 59
End: 2024-03-06
Payer: COMMERCIAL

## 2024-03-06 VITALS
WEIGHT: 237.2 LBS | OXYGEN SATURATION: 96 % | DIASTOLIC BLOOD PRESSURE: 92 MMHG | TEMPERATURE: 96.2 F | HEART RATE: 64 BPM | SYSTOLIC BLOOD PRESSURE: 152 MMHG | BODY MASS INDEX: 29.49 KG/M2 | HEIGHT: 75 IN

## 2024-03-06 DIAGNOSIS — N52.9 ERECTILE DYSFUNCTION, UNSPECIFIED ERECTILE DYSFUNCTION TYPE: ICD-10-CM

## 2024-03-06 DIAGNOSIS — E78.2 MIXED HYPERLIPIDEMIA: ICD-10-CM

## 2024-03-06 DIAGNOSIS — I10 HYPERTENSION, ESSENTIAL: ICD-10-CM

## 2024-03-06 DIAGNOSIS — Z12.11 SCREENING FOR COLON CANCER: Primary | ICD-10-CM

## 2024-03-06 RX ORDER — TADALAFIL 20 MG/1
20 TABLET ORAL DAILY PRN
Qty: 30 TABLET | Refills: 0 | Status: SHIPPED | OUTPATIENT
Start: 2024-03-06

## 2024-03-06 NOTE — PROGRESS NOTES
"Chief Complaint  Dizziness (Urgent care follow up )    Subjective         Demetrio Mantilla presents to [facility]  Patient presents to the office for follow-up regarding recent urgent care visit.  He states that he would like to have his ears checked to ensure that the fluid has improved.  He denies any dizziness at this time.  Blood pressure is elevated at 152/92.  He states he has not taken his blood pressure medicine the day.  He states that his blood pressure is typically well-controlled with his medications.  Patient does request a referral for colonoscopy.  He does state that he has noticed changes in his stool.  He also states that his mother had colon cancer.  His last colonoscopy was approximately 8 years ago which was unremarkable.  He states that he became concerned when he noticed the changes in his stool.  Patient also states that he is never had a low-dose CT screening of his lungs.  Patient states that he has smoked for approximately 35 to 40 years and averaged a pack per day.  Did explain that we would get this ordered and they would contact him from scheduling.  He verbalized understanding.        Dizziness         Objective     Vitals:    03/06/24 0728   BP: 152/92   BP Location: Right arm   Patient Position: Sitting   Cuff Size: Adult   Pulse: 64   Temp: 96.2 °F (35.7 °C)   TempSrc: Temporal   SpO2: 96%   Weight: 108 kg (237 lb 3.2 oz)   Height: 190.5 cm (75\")      Body mass index is 29.65 kg/m².             Physical Exam  Vitals reviewed.   Constitutional:       Appearance: Normal appearance.   HENT:      Right Ear: Tympanic membrane, ear canal and external ear normal.      Left Ear: Tympanic membrane, ear canal and external ear normal.   Cardiovascular:      Rate and Rhythm: Normal rate and regular rhythm.      Pulses: Normal pulses.      Heart sounds: Normal heart sounds, S1 normal and S2 normal. No murmur heard.  Pulmonary:      Effort: Pulmonary effort is " normal. No respiratory distress.      Breath sounds: Normal breath sounds.   Skin:     General: Skin is warm and dry.   Neurological:      Mental Status: He is alert and oriented to person, place, and time.   Psychiatric:         Attention and Perception: Attention normal.         Mood and Affect: Mood normal.         Behavior: Behavior normal.          Result Review :   The following data was reviewed by: JORGE Dior on 03/06/2024:      Procedures    Assessment and Plan   Diagnoses and all orders for this visit:    1. Screening for colon cancer (Primary)  -     Ambulatory Referral For Screening Colonoscopy    2. Hypertension, essential  -     CBC (No Diff); Future  -     Comprehensive Metabolic Panel; Future  -     Lipid Panel; Future    3. Mixed hyperlipidemia  -     CBC (No Diff); Future  -     Comprehensive Metabolic Panel; Future  -     Lipid Panel; Future    4. Erectile dysfunction, unspecified erectile dysfunction type  -     tadalafil (Cialis) 20 MG tablet; Take 1 tablet by mouth Daily As Needed for Erectile Dysfunction.  Dispense: 30 tablet; Refill: 0    Other orders  -     Cancel: MRI Hip Right Without Contrast; Future    Does request a prescription for Cialis.  I did discuss the medications as well as the risk and the benefits as well as the possible side effects.  I explained to the patient that he cannot use nitroglycerin while taking this medication.  He verbalizes understanding.      Follow Up   Return for Next scheduled follow up.  Patient was given instructions and counseling regarding his condition or for health maintenance advice. Please see specific information pulled into the AVS if appropriate.

## 2024-08-08 ENCOUNTER — OFFICE VISIT (OUTPATIENT)
Dept: CARDIOLOGY | Facility: CLINIC | Age: 59
End: 2024-08-08
Payer: COMMERCIAL

## 2024-08-08 VITALS
HEART RATE: 64 BPM | BODY MASS INDEX: 29.59 KG/M2 | WEIGHT: 238 LBS | SYSTOLIC BLOOD PRESSURE: 154 MMHG | HEIGHT: 75 IN | DIASTOLIC BLOOD PRESSURE: 81 MMHG

## 2024-08-08 DIAGNOSIS — I10 HYPERTENSION, ESSENTIAL: Chronic | ICD-10-CM

## 2024-08-08 DIAGNOSIS — E78.2 MIXED HYPERLIPIDEMIA: ICD-10-CM

## 2024-08-08 DIAGNOSIS — I25.10 CORONARY ARTERY DISEASE INVOLVING NATIVE CORONARY ARTERY OF NATIVE HEART WITHOUT ANGINA PECTORIS: Primary | ICD-10-CM

## 2024-08-08 PROCEDURE — 99213 OFFICE O/P EST LOW 20 MIN: CPT | Performed by: INTERNAL MEDICINE

## 2024-08-08 RX ORDER — LOSARTAN POTASSIUM 50 MG/1
50 TABLET ORAL DAILY
Qty: 90 TABLET | Refills: 3 | Status: SHIPPED | OUTPATIENT
Start: 2024-08-08

## 2024-08-08 RX ORDER — ATORVASTATIN CALCIUM 40 MG/1
40 TABLET, FILM COATED ORAL
Qty: 90 TABLET | Refills: 3 | Status: SHIPPED | OUTPATIENT
Start: 2024-08-08

## 2024-08-08 RX ORDER — METOPROLOL SUCCINATE 25 MG/1
25 TABLET, EXTENDED RELEASE ORAL DAILY
Qty: 90 TABLET | Refills: 3 | Status: SHIPPED | OUTPATIENT
Start: 2024-08-08

## 2024-08-08 NOTE — ASSESSMENT & PLAN NOTE
He reports some chest discomfort, which are atypical for angina.  Symptoms unchanged for the past several years.  Repeat Cardiac catheterization done in 2021 did not show any new lesions.  Recommend to continue aspirin, statin and beta-blocker.

## 2024-08-08 NOTE — ASSESSMENT & PLAN NOTE
Blood pressure borderline elevated in the office today.  He has not taken losartan this morning.  Counseled about medication compliance and low-sodium diet.  Continue losartan metoprolol without changes.

## 2024-08-08 NOTE — ASSESSMENT & PLAN NOTE
No recent lipid panel available for review.  Encouraged to have labs done as ordered by primary care provider.  Continue atorvastatin 40 mg nightly.  Will adjust dose as needed after reviewing the labs.

## 2024-08-08 NOTE — PROGRESS NOTES
CARDIOLOGY FOLLOW-UP PROGRESS NOTE        Chief Complaint  Follow-up, Coronary Artery Disease, Hypertension, and Hyperlipidemia    Subjective            Demetrio Mantilla presents to [facility] CARDIOLOGY  History of Present Illness    Mr Mantilla is here for a routine annual follow-up for coronary disease and hyperlipidemia.  He continues to report vague discomfort in the chest, mostly at rest.  Symptoms are unchanged for the past several years.  Denies shortness of breath, palpitations or dizziness.  Taking all the medications as prescribed.      Past History:    1) Coronary artery disease. Index presentation is acute myocardial infarction on 03/19/2020, status post angioplasty and stent placement to the right coronary artery; repeat Cardiac catheterization done on 5/21/2021 showed patent stent in RCA with no new lesions.  2) Negative for diabetes mellitus or hypertension.     Medical History:  Past Medical History:   Diagnosis Date    Coronary artery disease     Diabetes mellitus     Hyperlipidemia     Hypertension     Limb swelling     Myocardial infarction        Surgical History: has a past surgical history that includes Colonoscopy (01/01/2016).     Family History: family history includes Cancer in his mother; Diabetes in his brother; No Known Problems in his father.     Social History: reports that he has been smoking cigarettes. He has a 37.5 pack-year smoking history. He has never used smokeless tobacco. He reports current alcohol use. He reports that he does not use drugs.    Allergies: Patient has no known allergies.    Current Outpatient Medications on File Prior to Visit   Medication Sig    aspirin 81 MG chewable tablet Chew 1 tablet Daily.    cetirizine (zyrTEC) 10 MG tablet Take 1 tablet by mouth Daily.    tadalafil (Cialis) 20 MG tablet Take 1 tablet by mouth Daily As Needed for Erectile Dysfunction.    atorvastatin (LIPITOR) 40 MG tablet Take 1 tablet by mouth every night at  "bedtime.    losartan (COZAAR) 50 MG tablet Take 1 tablet by mouth Daily.    metoprolol succinate XL (TOPROL-XL) 25 MG 24 hr tablet Take 1 tablet by mouth Daily.         Review of Systems   Respiratory:  Negative for cough, shortness of breath and wheezing.    Cardiovascular:  Positive for chest pain (Atypical). Negative for palpitations and leg swelling.   Gastrointestinal:  Negative for nausea and vomiting.   Neurological:  Negative for dizziness and syncope.        Objective     /81   Pulse 64   Ht 190.5 cm (75\")   Wt 108 kg (238 lb)   BMI 29.75 kg/m²       Physical Exam    General : Alert, awake, no acute distress  Neck : Supple, no carotid bruit, no jugular venous distention  CVS : Regular rate and rhythm, no murmur, rubs or gallops  Lungs: Clear to auscultation bilaterally, no crackles or rhonchi  Abdomen: Soft, nontender, bowel sounds heard in all 4 quadrants  Extremities: Warm, well-perfused, no pedal edema    Result Review :     The following data was reviewed by: Aaron Minaya MD on 08/08/2024:    CMP          11/26/2023    19:48   CMP   Glucose 120    BUN 22    Creatinine 1.27    EGFR 65.9    Sodium 143    Potassium 4.1    Chloride 107    Calcium 9.5    Total Protein 7.2    Albumin 4.2    Globulin 3.0    Total Bilirubin 0.7    Alkaline Phosphatase 75    AST (SGOT) 22    ALT (SGPT) 30    Albumin/Globulin Ratio 1.4    BUN/Creatinine Ratio 17.3    Anion Gap 11.0      CBC          11/26/2023    19:48   CBC   WBC 9.70    RBC 4.89    Hemoglobin 15.4    Hematocrit 47.2    MCV 96.5    MCH 31.5    MCHC 32.6    RDW 13.4    Platelets 143               Data reviewed: Cardiology studies      Cardiac cath done on 5/20/2021 showed     1. Patent stent noted in the proximal to mid right coronary artery with no    in-stent re-stenosis.    2. Non-obstructive coronary artery disease noted in diagonal 1 branch, which    is unchanged from previous angiograms in 2020.    3. Overall LV ejection fraction is 55% with " mild mid inferior wall    hypokinesis.    4. There are no culprit lesions identified for patient presentation of    unstable angina indicating that the symptoms are likely non-cardiac.                 Assessment and Plan        Diagnoses and all orders for this visit:    1. Coronary artery disease involving native coronary artery of native heart without angina pectoris (Primary)  Assessment & Plan:  He reports some chest discomfort, which are atypical for angina.  Symptoms unchanged for the past several years.  Repeat Cardiac catheterization done in 2021 did not show any new lesions.  Recommend to continue aspirin, statin and beta-blocker.       2. Mixed hyperlipidemia  Assessment & Plan:  No recent lipid panel available for review.  Encouraged to have labs done as ordered by primary care provider.  Continue atorvastatin 40 mg nightly.  Will adjust dose as needed after reviewing the labs.    Orders:  -     atorvastatin (LIPITOR) 40 MG tablet; Take 1 tablet by mouth every night at bedtime.  Dispense: 90 tablet; Refill: 3    3. Hypertension, essential  Assessment & Plan:  Blood pressure borderline elevated in the office today.  He has not taken losartan this morning.  Counseled about medication compliance and low-sodium diet.  Continue losartan metoprolol without changes.    Orders:  -     losartan (COZAAR) 50 MG tablet; Take 1 tablet by mouth Daily.  Dispense: 90 tablet; Refill: 3  -     metoprolol succinate XL (TOPROL-XL) 25 MG 24 hr tablet; Take 1 tablet by mouth Daily.  Dispense: 90 tablet; Refill: 3              Follow Up     Return in about 9 months (around 5/8/2025) for Next scheduled follow up.    Patient was given instructions and counseling regarding his condition or for health maintenance advice. Please see specific information pulled into the AVS if appropriate.

## 2024-08-13 ENCOUNTER — LAB (OUTPATIENT)
Dept: LAB | Facility: HOSPITAL | Age: 59
End: 2024-08-13
Payer: COMMERCIAL

## 2024-08-13 DIAGNOSIS — I10 HYPERTENSION, ESSENTIAL: ICD-10-CM

## 2024-08-13 DIAGNOSIS — E78.2 MIXED HYPERLIPIDEMIA: ICD-10-CM

## 2024-08-13 LAB
ALBUMIN SERPL-MCNC: 4.1 G/DL (ref 3.5–5.2)
ALBUMIN/GLOB SERPL: 1.5 G/DL
ALP SERPL-CCNC: 74 U/L (ref 39–117)
ALT SERPL W P-5'-P-CCNC: 23 U/L (ref 1–41)
ANION GAP SERPL CALCULATED.3IONS-SCNC: 5.1 MMOL/L (ref 5–15)
AST SERPL-CCNC: 20 U/L (ref 1–40)
BILIRUB SERPL-MCNC: 0.6 MG/DL (ref 0–1.2)
BUN SERPL-MCNC: 20 MG/DL (ref 6–20)
BUN/CREAT SERPL: 16.7 (ref 7–25)
CALCIUM SPEC-SCNC: 9.6 MG/DL (ref 8.6–10.5)
CHLORIDE SERPL-SCNC: 113 MMOL/L (ref 98–107)
CHOLEST SERPL-MCNC: 108 MG/DL (ref 0–200)
CO2 SERPL-SCNC: 26.9 MMOL/L (ref 22–29)
CREAT SERPL-MCNC: 1.2 MG/DL (ref 0.76–1.27)
DEPRECATED RDW RBC AUTO: 43.4 FL (ref 37–54)
EGFRCR SERPLBLD CKD-EPI 2021: 70.1 ML/MIN/1.73
ERYTHROCYTE [DISTWIDTH] IN BLOOD BY AUTOMATED COUNT: 12.9 % (ref 12.3–15.4)
GLOBULIN UR ELPH-MCNC: 2.7 GM/DL
GLUCOSE SERPL-MCNC: 103 MG/DL (ref 65–99)
HCT VFR BLD AUTO: 47.3 % (ref 37.5–51)
HDLC SERPL-MCNC: 32 MG/DL (ref 40–60)
HGB BLD-MCNC: 15.9 G/DL (ref 13–17.7)
LDLC SERPL CALC-MCNC: 58 MG/DL (ref 0–100)
LDLC/HDLC SERPL: 1.81 {RATIO}
MCH RBC QN AUTO: 31.5 PG (ref 26.6–33)
MCHC RBC AUTO-ENTMCNC: 33.6 G/DL (ref 31.5–35.7)
MCV RBC AUTO: 93.8 FL (ref 79–97)
PLATELET # BLD AUTO: 125 10*3/MM3 (ref 140–450)
PMV BLD AUTO: 12.2 FL (ref 6–12)
POTASSIUM SERPL-SCNC: 4.5 MMOL/L (ref 3.5–5.2)
PROT SERPL-MCNC: 6.8 G/DL (ref 6–8.5)
RBC # BLD AUTO: 5.04 10*6/MM3 (ref 4.14–5.8)
SODIUM SERPL-SCNC: 145 MMOL/L (ref 136–145)
TRIGL SERPL-MCNC: 91 MG/DL (ref 0–150)
VLDLC SERPL-MCNC: 18 MG/DL (ref 5–40)
WBC NRBC COR # BLD AUTO: 7.97 10*3/MM3 (ref 3.4–10.8)

## 2024-08-13 PROCEDURE — 80061 LIPID PANEL: CPT

## 2024-08-13 PROCEDURE — 80053 COMPREHEN METABOLIC PANEL: CPT

## 2024-08-13 PROCEDURE — 36415 COLL VENOUS BLD VENIPUNCTURE: CPT

## 2024-08-13 PROCEDURE — 85027 COMPLETE CBC AUTOMATED: CPT

## 2025-01-28 ENCOUNTER — TELEPHONE (OUTPATIENT)
Dept: SURGERY | Facility: CLINIC | Age: 60
End: 2025-01-28
Payer: COMMERCIAL

## 2025-01-28 NOTE — TELEPHONE ENCOUNTER
CALLED PT. RE: PRE CARLY. PT. HAS NP APPT. W/PATRICIA, APRIL APRN 02/03. LFT. ABILIO MSG. FOR PT. TO CALL BACK PRIOR TO APPT.  RMB 01/28/25

## 2025-02-03 ENCOUNTER — OFFICE VISIT (OUTPATIENT)
Dept: SURGERY | Facility: CLINIC | Age: 60
End: 2025-02-03
Payer: COMMERCIAL

## 2025-02-03 ENCOUNTER — TELEPHONE (OUTPATIENT)
Dept: SURGERY | Facility: CLINIC | Age: 60
End: 2025-02-03

## 2025-02-03 ENCOUNTER — PREP FOR SURGERY (OUTPATIENT)
Dept: OTHER | Facility: HOSPITAL | Age: 60
End: 2025-02-03
Payer: COMMERCIAL

## 2025-02-03 VITALS
OXYGEN SATURATION: 96 % | DIASTOLIC BLOOD PRESSURE: 87 MMHG | BODY MASS INDEX: 30.1 KG/M2 | WEIGHT: 242.06 LBS | HEIGHT: 75 IN | SYSTOLIC BLOOD PRESSURE: 140 MMHG | HEART RATE: 64 BPM

## 2025-02-03 DIAGNOSIS — Z12.11 SCREENING FOR MALIGNANT NEOPLASM OF COLON: Primary | ICD-10-CM

## 2025-02-03 DIAGNOSIS — Z86.0100 HISTORY OF COLONIC POLYPS: ICD-10-CM

## 2025-02-03 DIAGNOSIS — Z80.0 FAMILY HISTORY OF COLON CANCER IN MOTHER: ICD-10-CM

## 2025-02-03 RX ORDER — SODIUM CHLORIDE 0.9 % (FLUSH) 0.9 %
10 SYRINGE (ML) INJECTION AS NEEDED
OUTPATIENT
Start: 2025-02-03

## 2025-02-03 RX ORDER — PEG-3350, SODIUM SULFATE, SODIUM CHLORIDE, POTASSIUM CHLORIDE, SODIUM ASCORBATE AND ASCORBIC ACID 7.5-2.691G
1000 KIT ORAL ONCE
Qty: 1000 ML | Refills: 0 | Status: SHIPPED | OUTPATIENT
Start: 2025-02-03 | End: 2025-02-03

## 2025-02-03 RX ORDER — SODIUM CHLORIDE 9 MG/ML
40 INJECTION, SOLUTION INTRAVENOUS AS NEEDED
OUTPATIENT
Start: 2025-02-03

## 2025-02-03 RX ORDER — SODIUM CHLORIDE 0.9 % (FLUSH) 0.9 %
3 SYRINGE (ML) INJECTION EVERY 12 HOURS SCHEDULED
OUTPATIENT
Start: 2025-02-03

## 2025-02-03 NOTE — PROGRESS NOTES
Chief Complaint: Colonoscopy    Subjective      Colonoscopy consultation       History of Present Illness  Demetrio Mantilla is a 59 y.o. male presents to [facility] for colonoscopy consultation.    Patient presents today on referral from Lonnie Lentz for colonoscopy consultation.  Patient denies any abdominal pain, change in bowel habit, or rectal bleeding.  Admits to family history of colon cancer with his mother.  Admits to history of colonic polyps.    Denies CHERYL.  Denies taking any GLP-1 receptors.    Patient does have a history of MI he is under the care of Dr. Minaya. I will get cardiac clearance prior to the procedure.    1/16: egd & colon ([facility]): hiatal hernia; gastritis; Cecum - sessile serrated adenoma; Ileocecal valve- tubular adenoma; Sigmoid- tubular adenoma; hemorrhoids.       Objective     Past Medical History:   Diagnosis Date    Coronary artery disease     Diabetes mellitus     Hyperlipidemia     Hypertension     Limb swelling     Myocardial infarction        Past Surgical History:   Procedure Laterality Date    COLONOSCOPY  01/01/2016       Outpatient Medications Marked as Taking for the 2/3/25 encounter (Office Visit) with Maribell Isabel APRN   Medication Sig Dispense Refill    aspirin 81 MG chewable tablet Chew 1 tablet Daily.      atorvastatin (LIPITOR) 40 MG tablet Take 1 tablet by mouth every night at bedtime. 90 tablet 3    losartan (COZAAR) 50 MG tablet Take 1 tablet by mouth Daily. 90 tablet 3    metoprolol succinate XL (TOPROL-XL) 25 MG 24 hr tablet Take 1 tablet by mouth Daily. 90 tablet 3    tadalafil (Cialis) 20 MG tablet Take 1 tablet by mouth Daily As Needed for Erectile Dysfunction. 30 tablet 0       No Known Allergies     Family History   Problem Relation Age of Onset    Cancer Mother         Unspecified    No Known Problems Father     Diabetes Brother         Unspecified type       Social History     Socioeconomic History    Marital  "status:    Tobacco Use    Smoking status: Every Day     Current packs/day: 1.50     Average packs/day: 1.5 packs/day for 25.0 years (37.5 ttl pk-yrs)     Types: Cigarettes    Smokeless tobacco: Never   Vaping Use    Vaping status: Never Used   Substance and Sexual Activity    Alcohol use: Yes     Alcohol/week: 6.0 standard drinks of alcohol     Types: 6 Cans of beer per week     Comment: socially    Drug use: Never    Sexual activity: Yes     Partners: Female       Review of Systems   Constitutional:  Negative for chills and fever.   Gastrointestinal:  Negative for abdominal distention, abdominal pain, anal bleeding, blood in stool, constipation, diarrhea and rectal pain.        Vital Signs:   /87 (BP Location: Left arm, Patient Position: Sitting, Cuff Size: Large Adult)   Pulse 64   Ht 190.5 cm (75\")   Wt 110 kg (242 lb 1 oz)   SpO2 96%   BMI 30.26 kg/m²      Physical Exam  Vitals and nursing note reviewed.   Constitutional:       General: He is not in acute distress.     Appearance: He is obese. He is not ill-appearing.   HENT:      Head: Normocephalic and atraumatic.   Cardiovascular:      Rate and Rhythm: Normal rate.   Pulmonary:      Effort: Pulmonary effort is normal.      Breath sounds: No stridor.   Abdominal:      Palpations: Abdomen is soft.      Tenderness: There is no guarding.   Musculoskeletal:         General: No deformity. Normal range of motion.   Skin:     General: Skin is warm and dry.      Coloration: Skin is not jaundiced.   Neurological:      General: No focal deficit present.      Mental Status: He is alert and oriented to person, place, and time.   Psychiatric:         Mood and Affect: Mood normal.         Thought Content: Thought content normal.          Result Review :          []  Laboratory  []  Radiology  []  Pathology  []  Microbiology  []  EKG/Telemetry   []  Cardiology/Vascular   []  Old records  I spent 15 minutes caring for Demetrio on this date of service. This " time includes time spent by me in the following activities: reviewing tests, obtaining and/or reviewing a separately obtained history, performing a medically appropriate examination and/or evaluation, ordering medications, tests, or procedures, and documenting information in the medical record        Assessment and Plan    Diagnoses and all orders for this visit:    1. Screening for malignant neoplasm of colon (Primary)    2. History of colonic polyps    3. Family history of colon cancer in mother    Other orders  -     PEG-KCl-NaCl-NaSulf-Na Asc-C (MOVIPREP) 100 g reconstituted solution powder; Take 1,000 mL by mouth 1 (One) Time for 1 dose.  Dispense: 1000 mL; Refill: 0        Follow Up   Return for Schedule colonoscopy with Dr. Chew on 3/11/2025 [facility].    Hospital arrival time: 12:30    Possible risks/complications, benefits, and alternatives to surgical or invasive procedures have been explained to patient and/or legal guardian.    Patient has been evaluated and can tolerate anesthesia and/or sedation. Risks, benefits, and alternatives to anesthesia and sedation have been explained to the patient and/or legal guardian. Patient verbalizes understanding and is willing to proceed with the above plan.     Patient was given instructions and counseling regarding his condition or for health maintenance advice. Please see specific information pulled into the AVS if appropriate.     As always, it has been a pleasure to participate in your patient's care. Please call with questions or concerns.

## 2025-02-03 NOTE — TELEPHONE ENCOUNTER
[facility] [place]  [facility]  [address]  [place] [address]  Fax [phone]  Phone [phone]     Patient: Demetrio Mantilla  YOB: 1965      This patient is waiting to have a Colonoscopy which will be perform at [facility] on 3/11/25 by Dr. Chew.     Please respond to this request noting your recommendations. You may contact our office at [phone] Option 1 then 4 with any questions. I appreciate your prompt response in this matter.     Please return this form to our office as soon as possible to [phone].        ____ I approve my patient from a cardiac standpoint.    ____ I do NOT approve my patient from a cardiac standpoint at this time.    Approving physician name (please print):     _____________________________________________    Approving physician signature:     ________________________________            Date:________________      Sincerely,    JORGE Altman

## 2025-02-07 ENCOUNTER — PATIENT ROUNDING (BHMG ONLY) (OUTPATIENT)
Dept: SURGERY | Facility: CLINIC | Age: 60
End: 2025-02-07
Payer: COMMERCIAL

## 2025-02-07 NOTE — PROGRESS NOTES
Hello, my name is Kalpesh. I am with [facility], [address], [place] [address]. In an effort to hear the opinions of our patients, I would like to ask you a few questions about your visit with our office.     Can you tell me about your visit with us? What things went well?    We're always looking for ways to make our patients' experiences even better. Do you have any recommendations on ways we may improve?    Overall, were you satisfied with your first visit to our practice?    Is there a particular staff member/s who you would like to recognize for doing a great job?      I appreciate you taking the time to answer these questions. The providers and staff here in our office want you to get the healthcare you need and deserve and we look forward to your comments.     Thank you for your input and have a wonderful day!

## 2025-02-10 NOTE — TELEPHONE ENCOUNTER
Procedure: Colonoscopy and/or EGD     Medication Directive: NA    PMH: HLD, HTN, CAD s/p PCI (3/25/2020)     Last Seen:08/08/24

## 2025-03-05 NOTE — PRE-PROCEDURE INSTRUCTIONS
"PAT call attempted.  No answer.  Detailed message with date and arrival time of 1230 given.  Instructed that arrival time is not procedure time but allows time to prepare for procedure. Come to entrance \"C\"; must have adult  for transportation home; may have two visitors; however, children under 12 must remain in waiting area; instructed on diet/clear liquids/NPO/bowel prep, if needed; may take normal meds two hours prior to arrival time except for blood thinners, antidiabetics, diuretics, and weight loss meds.  Instructed to return call to confirm receipt of instructions and for any questions.  Cardiac clearance noted in chart.  "

## 2025-03-10 ENCOUNTER — ANESTHESIA EVENT (OUTPATIENT)
Dept: GASTROENTEROLOGY | Facility: HOSPITAL | Age: 60
End: 2025-03-10
Payer: COMMERCIAL

## 2025-03-10 NOTE — ANESTHESIA PREPROCEDURE EVALUATION
Anesthesia Evaluation     Patient summary reviewed and Nursing notes reviewed   NPO Solid Status: > 8 hours  NPO Liquid Status: > 2 hours           Airway   Mallampati: I  TM distance: >3 FB  Neck ROM: full  No difficulty expected  Dental          Pulmonary - normal exam    breath sounds clear to auscultation  (+) a smoker Current, Smoked day of surgery, cigarettes,  Cardiovascular     ECG reviewed  Patient on routine beta blocker  Rhythm: regular  Rate: normal    (+) hypertension well controlled, past MI (MI-2021 no recent hx of CP/SOA) , CAD, hyperlipidemia      Neuro/Psych  (+) psychiatric history  GI/Hepatic/Renal/Endo      Musculoskeletal     Abdominal   (+) obese    Abdomen: soft.  Bowel sounds: normal.   Substance History      OB/GYN          Other        ROS/Med Hx Other: EKG on 11-26-23:  NSR, HR 61    Cardiac cath on 5-20-21:  1. Patent stent noted in the proximal to mid right coronary artery with no    in-stent re-stenosis.    2. Non-obstructive coronary artery disease noted in diagonal 1 branch, which    is unchanged from previous angiograms in 2020.    3. Overall LV ejection fraction is 55% with mild mid inferior wall    hypokinesis.    4. There are no culprit lesions identified for patient presentation of    unstable angina indicating that the symptoms are likely non-cardiac.      Cardiac clearance per Dr. Minaya on 2-13-25 with moderate cardiac risk                     Anesthesia Plan    ASA 3     general   total IV anesthesia  (Total IV Anesthesia    Patient understands anesthesia not responsible for dental damage.    Discussed risks with pt including aspiration, allergic reactions, apnea, advanced airway placement. Pt verbalized understanding. All questions answered.  )  intravenous induction     Anesthetic plan, risks, benefits, and alternatives have been provided, discussed and informed consent has been obtained with: patient.  Pre-procedure education provided  Plan discussed with  CRNA.        CODE STATUS:

## 2025-03-11 ENCOUNTER — HOSPITAL ENCOUNTER (OUTPATIENT)
Facility: HOSPITAL | Age: 60
Setting detail: HOSPITAL OUTPATIENT SURGERY
Discharge: HOME | End: 2025-03-11
Attending: SURGERY | Admitting: SURGERY
Payer: COMMERCIAL

## 2025-03-11 ENCOUNTER — ANESTHESIA (OUTPATIENT)
Dept: GASTROENTEROLOGY | Facility: HOSPITAL | Age: 60
End: 2025-03-11
Payer: COMMERCIAL

## 2025-03-11 VITALS
HEART RATE: 52 BPM | WEIGHT: 233.03 LBS | RESPIRATION RATE: 14 BRPM | TEMPERATURE: 97.9 F | HEIGHT: 75 IN | BODY MASS INDEX: 28.97 KG/M2 | OXYGEN SATURATION: 98 % | DIASTOLIC BLOOD PRESSURE: 90 MMHG | SYSTOLIC BLOOD PRESSURE: 157 MMHG

## 2025-03-11 DIAGNOSIS — Z86.0100 HISTORY OF COLONIC POLYPS: ICD-10-CM

## 2025-03-11 DIAGNOSIS — Z80.0 FAMILY HISTORY OF COLON CANCER IN MOTHER: ICD-10-CM

## 2025-03-11 DIAGNOSIS — Z12.11 SCREENING FOR MALIGNANT NEOPLASM OF COLON: ICD-10-CM

## 2025-03-11 PROCEDURE — 25010000002 PROPOFOL 10 MG/ML EMULSION: Performed by: NURSE ANESTHETIST, CERTIFIED REGISTERED

## 2025-03-11 PROCEDURE — 25010000002 LIDOCAINE PF 2% 2 % SOLUTION: Performed by: NURSE ANESTHETIST, CERTIFIED REGISTERED

## 2025-03-11 PROCEDURE — 88305 TISSUE EXAM BY PATHOLOGIST: CPT | Performed by: SURGERY

## 2025-03-11 PROCEDURE — 25810000003 LACTATED RINGERS PER 1000 ML

## 2025-03-11 DEVICE — DEV CLIP ENDO RESOLUTION360 CONTRL ROT 235CM: Type: IMPLANTABLE DEVICE | Site: SIGMOID COLON | Status: FUNCTIONAL

## 2025-03-11 RX ORDER — SODIUM CHLORIDE 0.9 % (FLUSH) 0.9 %
10 SYRINGE (ML) INJECTION AS NEEDED
Status: DISCONTINUED | OUTPATIENT
Start: 2025-03-11 | End: 2025-03-11 | Stop reason: HOSPADM

## 2025-03-11 RX ORDER — LIDOCAINE HYDROCHLORIDE 20 MG/ML
INJECTION, SOLUTION EPIDURAL; INFILTRATION; INTRACAUDAL; PERINEURAL AS NEEDED
Status: DISCONTINUED | OUTPATIENT
Start: 2025-03-11 | End: 2025-03-11 | Stop reason: SURG

## 2025-03-11 RX ORDER — SODIUM CHLORIDE 0.9 % (FLUSH) 0.9 %
3 SYRINGE (ML) INJECTION EVERY 12 HOURS SCHEDULED
Status: DISCONTINUED | OUTPATIENT
Start: 2025-03-11 | End: 2025-03-11 | Stop reason: HOSPADM

## 2025-03-11 RX ORDER — PROPOFOL 10 MG/ML
VIAL (ML) INTRAVENOUS AS NEEDED
Status: DISCONTINUED | OUTPATIENT
Start: 2025-03-11 | End: 2025-03-11 | Stop reason: SURG

## 2025-03-11 RX ORDER — SODIUM CHLORIDE, SODIUM LACTATE, POTASSIUM CHLORIDE, CALCIUM CHLORIDE 600; 310; 30; 20 MG/100ML; MG/100ML; MG/100ML; MG/100ML
30 INJECTION, SOLUTION INTRAVENOUS CONTINUOUS
Status: DISCONTINUED | OUTPATIENT
Start: 2025-03-11 | End: 2025-03-11 | Stop reason: HOSPADM

## 2025-03-11 RX ORDER — SODIUM CHLORIDE 9 MG/ML
40 INJECTION, SOLUTION INTRAVENOUS AS NEEDED
Status: DISCONTINUED | OUTPATIENT
Start: 2025-03-11 | End: 2025-03-11 | Stop reason: HOSPADM

## 2025-03-11 RX ADMIN — LIDOCAINE HYDROCHLORIDE 100 MG: 20 INJECTION, SOLUTION EPIDURAL; INFILTRATION; INTRACAUDAL; PERINEURAL at 12:55

## 2025-03-11 RX ADMIN — PROPOFOL 225 MCG/KG/MIN: 10 INJECTION, EMULSION INTRAVENOUS at 12:55

## 2025-03-11 RX ADMIN — SODIUM CHLORIDE, SODIUM LACTATE, POTASSIUM CHLORIDE, CALCIUM CHLORIDE 30 ML/HR: 20; 30; 600; 310 INJECTION, SOLUTION INTRAVENOUS at 12:38

## 2025-03-11 RX ADMIN — PROPOFOL 100 MG: 10 INJECTION, EMULSION INTRAVENOUS at 12:55

## 2025-03-11 NOTE — H&P
General Surgery/Colorectal Surgery Note    Patient Name:  Demetrio Mantilla  YOB: 1965  [id number]    Referring Provider: Maribell Isabel APRN      Patient Care Team:  Lonnie Barboza APRN as PCP - General (Nurse Practitioner)    Subjective .     History of present illness:    HPI   referral from Lonnie Lentz for colonoscopy consultation. Patient denies any abdominal pain, change in bowel habit, or rectal bleeding. Admits to family history of colon cancer with his mother. Admits to history of colonic polyps.     History:  Past Medical History:   Diagnosis Date    Coronary artery disease     Diabetes mellitus     Hyperlipidemia     Hypertension     Limb swelling     Myocardial infarction        Past Surgical History:   Procedure Laterality Date    COLONOSCOPY  01/01/2016       Family History   Problem Relation Age of Onset    Cancer Mother         Unspecified    No Known Problems Father     Diabetes Brother         Unspecified type       Social History     Tobacco Use    Smoking status: Every Day     Current packs/day: 1.50     Average packs/day: 1.5 packs/day for 25.0 years (37.5 ttl pk-yrs)     Types: Cigarettes    Smokeless tobacco: Never   Vaping Use    Vaping status: Never Used   Substance Use Topics    Alcohol use: Yes     Alcohol/week: 6.0 standard drinks of alcohol     Types: 6 Cans of beer per week     Comment: socially    Drug use: Never       Review of Systems: See HPI    Review of Systems            Medications Prior to Admission   Medication Sig Dispense Refill Last Dose/Taking    aspirin 81 MG chewable tablet Chew 1 tablet Daily.       atorvastatin (LIPITOR) 40 MG tablet Take 1 tablet by mouth every night at bedtime. 90 tablet 3     losartan (COZAAR) 50 MG tablet Take 1 tablet by mouth Daily. 90 tablet 3     metoprolol succinate XL (TOPROL-XL) 25 MG 24 hr tablet Take 1 tablet by mouth Daily. 90 tablet 3     tadalafil (Cialis) 20 MG tablet Take 1 tablet by mouth Daily As Needed for  Erectile Dysfunction. 30 tablet 0          Home Meds:      Prior to Admission medications    Medication Sig Start Date End Date Taking? Authorizing Provider   aspirin 81 MG chewable tablet Chew 1 tablet Daily.    Emergency, Nurse Malgorzata, RN   atorvastatin (LIPITOR) 40 MG tablet Take 1 tablet by mouth every night at bedtime. 8/8/24   Aaron Minaya MD   losartan (COZAAR) 50 MG tablet Take 1 tablet by mouth Daily. 8/8/24   Aaron Minaya MD   metoprolol succinate XL (TOPROL-XL) 25 MG 24 hr tablet Take 1 tablet by mouth Daily. 8/8/24   Aaron Minaya MD   tadalafil (Cialis) 20 MG tablet Take 1 tablet by mouth Daily As Needed for Erectile Dysfunction. 3/6/24   Lonnie Barboza APRN            Allergies:  Patient has no known allergies.      Objective     Vital Signs        Physical Exam:     Physical Exam    NAD, A/O x 4, normal circulation, normal respiration      Result Review :Labs  Result Review  Imaging  Med Tab  Media    Assessment & Plan     There are no diagnoses linked to this encounter.       Risks including bleeding, perforation, pain, infection, missed polyp(s). Benefits, and alternatives of Colonoscopy discussed with patient.  All questions answered.  Consent verified.         Odilon Chew MD  03/11/25 12:16 EDT

## 2025-03-11 NOTE — ANESTHESIA POSTPROCEDURE EVALUATION
Patient: Demetrio Mantilla    Procedure Summary       Date: 03/11/25 Room / Location: [facility] ENDOSCOPY 3 / [facility] ENDOSCOPY    Anesthesia Start: 1254 Anesthesia Stop: 1334    Procedure: COLONOSCOPY WITH POLYPECTOMIES HOT/COLD SNARE. CLIP APPLICATION X1 Diagnosis:       Screening for malignant neoplasm of colon      History of colonic polyps      Family history of colon cancer in mother      (Screening for malignant neoplasm of colon [Z12.11])      (History of colonic polyps [Z86.0100])      (Family history of colon cancer in mother [Z80.0])    Surgeons: Odilon Chew MD Provider: Onofre Avila CRNA    Anesthesia Type: general ASA Status: 3            Anesthesia Type: general    Vitals  Vitals Value Taken Time   /90 03/11/25 13:53   Temp 36.6 °C (97.9 °F) 03/11/25 13:53   Pulse 52 03/11/25 13:53   Resp 14 03/11/25 13:53   SpO2 98 % 03/11/25 13:53           Post Anesthesia Care and Evaluation    Patient location during evaluation: bedside  Patient participation: complete - patient participated  Level of consciousness: awake  Pain management: adequate    Airway patency: patent  Anesthetic complications: No anesthetic complications  PONV Status: controlled  Cardiovascular status: acceptable and stable  Respiratory status: acceptable

## 2025-03-13 ENCOUNTER — RESULTS FOLLOW-UP (OUTPATIENT)
Dept: GASTROENTEROLOGY | Facility: HOSPITAL | Age: 60
End: 2025-03-13
Payer: COMMERCIAL

## 2025-03-13 LAB
CYTO UR: NORMAL
LAB AP CASE REPORT: NORMAL
LAB AP CLINICAL INFORMATION: NORMAL
PATH REPORT.FINAL DX SPEC: NORMAL
PATH REPORT.GROSS SPEC: NORMAL

## 2025-03-13 NOTE — TELEPHONE ENCOUNTER
PATIENT RETURNED CALL TO [facility].  I TOLD HIM:  PER DR VALENZUELA:     Call patient with results. No cancer found on biopsies. Need to repeat colonoscopy in 3 months. He should have a follow-up appointment with April     PATIENT VOICED UNDERSTANDING.  HE IS AWARE OF APPOINTMENT WITH APRIL ON 03/26/25 AT 9:15 AM.

## 2025-03-13 NOTE — TELEPHONE ENCOUNTER
LMOM     PER DR VALENZUELA:    Call patient with results. No cancer found on biopsies. Need to repeat colonoscopy in 3 months. He should have a follow-up appointment with April

## 2025-03-13 NOTE — TELEPHONE ENCOUNTER
----- Message from Odilon Chew sent at 3/13/2025  3:34 PM EDT -----  Call patient with results.  No cancer found on biopsies.  Need to repeat colonoscopy in 3 months.  He should have a follow-up appointment with April  ----- Message -----  From: Lab, Background User  Sent: 3/13/2025   2:49 PM EDT  To: Odilon Chew MD

## 2025-03-26 ENCOUNTER — OFFICE VISIT (OUTPATIENT)
Dept: SURGERY | Facility: CLINIC | Age: 60
End: 2025-03-26
Payer: COMMERCIAL

## 2025-03-26 ENCOUNTER — PREP FOR SURGERY (OUTPATIENT)
Dept: OTHER | Facility: HOSPITAL | Age: 60
End: 2025-03-26
Payer: COMMERCIAL

## 2025-03-26 VITALS — WEIGHT: 233 LBS | HEIGHT: 75 IN | BODY MASS INDEX: 28.97 KG/M2

## 2025-03-26 DIAGNOSIS — D12.6 SESSILE SERRATED POLYP OF COLON: ICD-10-CM

## 2025-03-26 DIAGNOSIS — D36.9 TUBULAR ADENOMA: ICD-10-CM

## 2025-03-26 DIAGNOSIS — D12.6 HIGH GRADE DYSPLASIA IN COLONIC ADENOMA: ICD-10-CM

## 2025-03-26 DIAGNOSIS — D36.9 TUBULOVILLOUS ADENOMA: Primary | ICD-10-CM

## 2025-03-26 DIAGNOSIS — Z98.890 S/P COLONOSCOPY WITH POLYPECTOMY: ICD-10-CM

## 2025-03-26 RX ORDER — PEG-3350, SODIUM SULFATE, SODIUM CHLORIDE, POTASSIUM CHLORIDE, SODIUM ASCORBATE AND ASCORBIC ACID 7.5-2.691G
1000 KIT ORAL ONCE
Qty: 1000 ML | Refills: 0 | Status: SHIPPED | OUTPATIENT
Start: 2025-03-26 | End: 2025-03-26

## 2025-03-26 RX ORDER — SODIUM CHLORIDE 0.9 % (FLUSH) 0.9 %
3 SYRINGE (ML) INJECTION EVERY 12 HOURS SCHEDULED
OUTPATIENT
Start: 2025-03-26

## 2025-03-26 RX ORDER — FLUCONAZOLE 200 MG/1
TABLET ORAL
COMMUNITY
Start: 2025-03-02

## 2025-03-26 RX ORDER — SODIUM CHLORIDE 0.9 % (FLUSH) 0.9 %
10 SYRINGE (ML) INJECTION AS NEEDED
OUTPATIENT
Start: 2025-03-26

## 2025-03-26 RX ORDER — TRIAMCINOLONE ACETONIDE 1 MG/G
CREAM TOPICAL
COMMUNITY
Start: 2025-02-05

## 2025-03-26 RX ORDER — CLOBETASOL PROPIONATE 0.5 MG/G
OINTMENT TOPICAL
COMMUNITY
Start: 2025-03-06

## 2025-03-26 RX ORDER — SODIUM CHLORIDE 9 MG/ML
40 INJECTION, SOLUTION INTRAVENOUS AS NEEDED
OUTPATIENT
Start: 2025-03-26

## 2025-03-26 NOTE — PROGRESS NOTES
Chief Complaint: Follow-up    Subjective      Follow-up visit       History of Present Illness  Demetrio Mantilla is a 59 y.o. male presents to [facility] GENER[facility]ERY for follow-up visit.    Patient presents today for follow-up visit after undergoing colonoscopy on 3/11/2025 performed by Dr. Odilon Chew.    Patient was with a tubular adenoma of the cecum and a sessile serrated lesion of the cecum; 15mm tubulovillous adenoma of the ascending colon; 2 tubular adenomas of the transverse colon; 12 mm tubulovillous adenoma of the descending colon; 3 tubulovillous adenomas with high-grade dysplasia of the sigmoid colon and a tubular adenoma of the rectum per colonoscopy/pathology.  Resection retrieval were complete.    Results for orders placed or performed during the hospital encounter of 03/11/25   Tissue Pathology Exam    Collection Time: 03/11/25  1:03 PM    Specimen: A: Large Intestine, Cecum; Polyp    B: Large Intestine, Right / Ascending Colon; Polyp    C: Large Intestine, Transverse Colon; Polyp    D: Large Intestine, Left / Descending Colon; Polyp    E: Large Intestine, Sigmoid Colon; Polyp    F: Large Intestine, Rectum; Tissue   Result Value Ref Range    Case Report       Surgical Pathology Report                         Case: [id number]                                  Authorizing Provider:  Odilon Chew MD  Collected:           03/11/2025 01:03 PM          Ordering Location:     [facility] Received:            03/12/2025 08:03 AM                                 SUITES                                                                       Pathologist:           Shyann Faria DO                                                       Specimens:   1) - Large Intestine, Cecum, CECUM POLYPS                                                           2) - Large Intestine, Right / Ascending Colon, ASCENDING COLON POLYP                                3) - Large  "Intestine, Transverse Colon, TRANSVERSE COLON POLYPS                                     4) - Large Intestine, Left / Descending Colon, DESCENDING COLON POLYP                               5) - Large Intestine, Sigmoid Colon, SIGMOID COLON POLYP                                             6) - Large Intestine, Rectum, RECTUM POLYP                                                 Clinical Information       Screening for malignant neoplasm of colon  History of colonic polyps  Family history of colon cancer in mother      Final Diagnosis       1. Cecum polyps, biopsy:   - Fragments of tubular adenoma and sessile serrated lesion      2. Ascending colon polyp, biopsy:   - Fragments of villous adenoma      3. Transverse colon polyps, biopsy:   - Fragments of tubular adenoma      4. Descending colon polyp, biopsy:   - Tubulovillous adenoma      5. Sigmoid colon polyp, biopsy:   - Fragments of tubulovillous adenoma with high-grade dysplasia      6. Rectum polyp, biopsy:   - Tubular adenoma          Gross Description       1. Large Intestine, Cecum.  Received in formalin and labeled \"cecum polyps\" are 3 fragments of tan soft tissue measuring 0.2-1.0 cm in greatest dimension.  The largest fragment is inked blue and bisected.  The specimen is entirely submitted in 1 cassette.    2. Large Intestine, Right / Ascending Colon.  Received in formalin and labeled \" ascending colon polyp\" is a 0.8 cm aggregate of tan soft tissue fragments.  The 0.7 cm largest fragment is inked blue and bisected.  The specimen is entirely submitted in one cassette.    3. Large Intestine, Transverse Colon.  Received in formalin and labeled \" transverse colon polyps\" are three fragments of tan soft tissue measuring 0.3-0.4 cm in greatest dimension. The specimen is entirely submitted in one cassette.    4. Large Intestine, Left / Descending Colon.  Received in formalin and labeled \"descending colon polyp\" is a 0.7 cm tan polyp.  The margin is inked blue " "and bisected.  The specimen is entirely submitted in 1 cassette.    5. Large Intestine, Sigmoid Colon.  Received in formalin and labeled \"sigmoid colon polyp\" is a 4.5 cm aggregate of disrupted polyps ranging in size from 0.3-2.5 cm.  The 6 largest fragments are inked blue and sectioned.  The specimen is entirely submitted in 11 cassettes.    6. Large Intestine, Rectum.  Received in formalin and labeled \"rectum polyp\" is a 1.3 cm pedunculated polyp.  The margin is inked blue and sectioned.  The specimen is entirely submitted in 1 cassette.   KEVIN      Microscopic Description       Microscopic examination performed.       Colonoscopy is performed at [facility].  The patient tolerated the procedure well    Patient denies any postoperative complications.      Objective     Past Medical History:   Diagnosis Date    Coronary artery disease     Hyperlipidemia     Hypertension     Limb swelling     Myocardial infarction        Past Surgical History:   Procedure Laterality Date    COLONOSCOPY  01/01/2016    COLONOSCOPY N/A 3/11/2025    Procedure: COLONOSCOPY WITH POLYPECTOMIES HOT/COLD SNARE. CLIP APPLICATION X1;  Surgeon: Odilon Chew MD;  Location: [facility] ENDOSCOPY;  Service: General;  Laterality: N/A;  COLON POLYPS. DIVERTICULOSIS       Outpatient Medications Marked as Taking for the 3/26/25 encounter (Office Visit) with Chalo, April, APRN   Medication Sig Dispense Refill    aspirin 81 MG chewable tablet Chew 1 tablet Daily.      atorvastatin (LIPITOR) 40 MG tablet Take 1 tablet by mouth every night at bedtime. 90 tablet 3    clobetasol (TEMOVATE) 0.05 % ointment APPLY A THIN LAYER TO FEET AND LEGS TWICE EVERY DAY AS NEEDED      fluconazole (DIFLUCAN) 200 MG tablet TAKE ONE TABLET BY MOUTH ONCE A WEEK X 6 WEEKS      losartan (COZAAR) 50 MG tablet Take 1 tablet by mouth Daily. 90 tablet 3    metoprolol succinate XL (TOPROL-XL) 25 MG 24 hr tablet Take 1 tablet by mouth Daily. 90 tablet 3    triamcinolone (KENALOG) " "0.1 % cream APPLY TWICE DAILY TO LEFT CHEEK AS NEEDED.         No Known Allergies     Family History   Problem Relation Age of Onset    Cancer Mother         Unspecified    No Known Problems Father     Diabetes Brother         Unspecified type       Social History     Socioeconomic History    Marital status:    Tobacco Use    Smoking status: Every Day     Current packs/day: 1.50     Average packs/day: 1.5 packs/day for 25.0 years (37.5 ttl pk-yrs)     Types: Cigarettes    Smokeless tobacco: Never   Vaping Use    Vaping status: Never Used   Substance and Sexual Activity    Alcohol use: Yes     Alcohol/week: 6.0 standard drinks of alcohol     Types: 6 Cans of beer per week     Comment: socially    Drug use: Never    Sexual activity: Yes     Partners: Female       Review of Systems   Constitutional:  Negative for chills and fever.   Gastrointestinal:  Negative for abdominal distention, abdominal pain, anal bleeding, blood in stool, constipation, diarrhea and rectal pain.        Vital Signs:   Ht 190.5 cm (75\")   Wt 106 kg (233 lb)   BMI 29.12 kg/m²      Physical Exam  Vitals and nursing note reviewed.   Constitutional:       General: He is not in acute distress.     Appearance: Normal appearance. He is not ill-appearing.   HENT:      Head: Normocephalic and atraumatic.   Cardiovascular:      Rate and Rhythm: Normal rate.   Pulmonary:      Effort: Pulmonary effort is normal.      Breath sounds: No stridor.   Abdominal:      Palpations: Abdomen is soft.      Tenderness: There is no guarding.   Musculoskeletal:         General: No deformity. Normal range of motion.   Skin:     General: Skin is warm and dry.      Coloration: Skin is not jaundiced.   Neurological:      General: No focal deficit present.      Mental Status: He is alert and oriented to person, place, and time.   Psychiatric:         Mood and Affect: Mood normal.         Thought Content: Thought content normal.          Result Review :          []  " Laboratory  []  Radiology  []  Pathology  []  Microbiology  []  EKG/Telemetry   []  Cardiology/Vascular   []  Old records  Today reviewed Dr. Chew's operative and pathology report     Assessment and Plan    Diagnoses and all orders for this visit:    1. Tubulovillous adenoma (Primary)  -     Genetic Testing - Saliva,; Future    2. High grade dysplasia in colonic adenoma  -     Genetic Testing - Saliva,; Future    3. S/P colonoscopy with polypectomy  -     Genetic Testing - Saliva,; Future    4. Tubular adenoma  -     Genetic Testing - Saliva,; Future    5. Sessile serrated polyp of colon  -     Genetic Testing - Saliva,; Future    Other orders  -     PEG-KCl-NaCl-NaSulf-Na Asc-C (MOVIPREP) 100 g reconstituted solution powder; Take 1,000 mL by mouth 1 (One) Time for 1 dose.  Dispense: 1000 mL; Refill: 0        Follow Up   Return for Repeat colonoscopy on 6/17/2025 with Dr. Chew at [facility].    Hospital arrival time:0600    Possible risks/complications, benefits, and alternatives to surgical or invasive procedures have been explained to patient and/or legal guardian.    Patient has been evaluated and can tolerate anesthesia and/or sedation. Risks, benefits, and alternatives to anesthesia and sedation have been explained to the patient and/or legal guardian. Patient verbalizes understanding and is willing to proceed with the above plan.     Patient was given instructions and counseling regarding his condition or for health maintenance advice. Please see specific information pulled into the AVS if appropriate.     As always, it has been a pleasure to participate in your patient's care. Please call with questions or concerns.

## 2025-03-31 ENCOUNTER — TELEPHONE (OUTPATIENT)
Dept: SURGERY | Facility: CLINIC | Age: 60
End: 2025-03-31
Payer: COMMERCIAL

## 2025-03-31 NOTE — TELEPHONE ENCOUNTER
Spoke to pt, he would like to be r/s for 6-26-25/6am. He is currently at work so he will call back to get his arrival time. If pt agrees to this date and time, He will need a cardiac clearance sent to .

## 2025-04-01 NOTE — TELEPHONE ENCOUNTER
Sent instructions to ember guzman. Sent reminder closer to time to reach out for cardiac clearance

## 2025-04-02 ENCOUNTER — TELEPHONE (OUTPATIENT)
Dept: SURGERY | Facility: CLINIC | Age: 60
End: 2025-04-02
Payer: COMMERCIAL

## 2025-04-02 NOTE — TELEPHONE ENCOUNTER
I lmom for the patient to call the office back. I was unable to mail the genetic testing to his house. He needs to come by the office to do that. I need to know when would be a good time for him to make sure I will be here. Please let me know when he calls back.

## 2025-04-02 NOTE — TELEPHONE ENCOUNTER
Pt called the office back and is aware to come by to do his genetic testing. Pt will come by the office on 4/3/25 and V/U.

## 2025-05-12 ENCOUNTER — OFFICE VISIT (OUTPATIENT)
Dept: CARDIOLOGY | Facility: CLINIC | Age: 60
End: 2025-05-12
Payer: COMMERCIAL

## 2025-05-12 VITALS
HEART RATE: 61 BPM | WEIGHT: 235.2 LBS | OXYGEN SATURATION: 96 % | HEIGHT: 75 IN | BODY MASS INDEX: 29.24 KG/M2 | SYSTOLIC BLOOD PRESSURE: 129 MMHG | DIASTOLIC BLOOD PRESSURE: 87 MMHG

## 2025-05-12 DIAGNOSIS — I10 HYPERTENSION, ESSENTIAL: ICD-10-CM

## 2025-05-12 DIAGNOSIS — E78.2 MIXED HYPERLIPIDEMIA: ICD-10-CM

## 2025-05-12 DIAGNOSIS — I25.10 CORONARY ARTERY DISEASE INVOLVING NATIVE CORONARY ARTERY OF NATIVE HEART WITHOUT ANGINA PECTORIS: Primary | ICD-10-CM

## 2025-05-12 PROCEDURE — 99214 OFFICE O/P EST MOD 30 MIN: CPT | Performed by: INTERNAL MEDICINE

## 2025-05-12 NOTE — ASSESSMENT & PLAN NOTE
Labs done last year showed LDL of 58, at goal.  Continue atorvastatin 40 mg nightly.  He has PCP appointment in the coming few weeks.

## 2025-05-12 NOTE — ASSESSMENT & PLAN NOTE
No anginal symptoms.  Continues to have atypical mild chest discomfort, unchanged over the past several years.  Encouraged to restart taking aspirin 81 mg daily.  Continue beta-blockers and statins.

## 2025-05-12 NOTE — PROGRESS NOTES
CARDIOLOGY FOLLOW-UP PROGRESS NOTE        Chief Complaint  Follow-up, Hyperlipidemia, Hypertension, and Coronary Artery Disease    Subjective            Demetrio Mantilla presents to [facility]  History of Present Illness    Mr. Mantilla is here for a routine 9-month follow-up visit.  He denies any exertional chest pain, shortness of breath, palpitations or dizziness.  Recently told to have a bone spur of the ankle which hurts when walking.  He continues to report vague and mild chest discomfort at rest, unchanged over the past several years.  He has not taken aspirin for some time.    Past History:    1) Coronary artery disease. Index presentation is acute myocardial infarction on 03/19/2020, status post angioplasty and stent placement to the right coronary artery; repeat Cardiac catheterization done on 5/21/2021 showed patent stent in RCA with no new lesions.  2) Negative for diabetes mellitus or hypertension.     Medical History:  Past Medical History:   Diagnosis Date    Coronary artery disease     Hyperlipidemia     Hypertension     Limb swelling     Myocardial infarction        Family History: family history includes Cancer in his mother; Diabetes in his brother; No Known Problems in his father.     Social History: reports that he has been smoking cigarettes. He has a 37.5 pack-year smoking history. He has never used smokeless tobacco. He reports current alcohol use of about 6.0 standard drinks of alcohol per week. He reports that he does not use drugs.    Allergies: Patient has no known allergies.    Current Outpatient Medications on File Prior to Visit   Medication Sig    atorvastatin (LIPITOR) 40 MG tablet Take 1 tablet by mouth every night at bedtime.    clobetasol (TEMOVATE) 0.05 % ointment APPLY A THIN LAYER TO FEET AND LEGS TWICE EVERY DAY AS NEEDED    losartan (COZAAR) 50 MG tablet Take 1 tablet by mouth Daily.    metoprolol succinate XL (TOPROL-XL) 25 MG 24 hr tablet  "Take 1 tablet by mouth Daily.    triamcinolone (KENALOG) 0.1 % cream APPLY TWICE DAILY TO LEFT CHEEK AS NEEDED.    aspirin 81 MG chewable tablet Chew 1 tablet Daily. (Patient not taking: Reported on 5/12/2025)         Review of Systems   Respiratory:  Negative for cough, shortness of breath and wheezing.    Cardiovascular:  Negative for chest pain, palpitations and leg swelling.   Gastrointestinal:  Negative for nausea and vomiting.   Musculoskeletal:  Positive for arthralgias.   Neurological:  Negative for dizziness and syncope.        Objective     /87 (BP Location: Left arm, Patient Position: Sitting)   Pulse 61   Ht 190.5 cm (75\")   Wt 107 kg (235 lb 3.2 oz)   SpO2 96%   BMI 29.40 kg/m²       Physical Exam    General : Alert, awake, no acute distress  Neck : Supple, no carotid bruit, no jugular venous distention  CVS : Regular rate and rhythm, no murmur, rubs or gallops  Lungs: Clear to auscultation bilaterally, no crackles or rhonchi  Abdomen: Soft, nontender, bowel sounds heard in all 4 quadrants  Extremities: Warm, well-perfused, no pedal edema    Result Review :     The following data was reviewed by: Aaron Minaya MD on 05/12/2025:    CMP          8/13/2024    08:44   CMP   Glucose 103    BUN 20    Creatinine 1.20    EGFR 70.1    Sodium 145    Potassium 4.5    Chloride 113    Calcium 9.6    Total Protein 6.8    Albumin 4.1    Globulin 2.7    Total Bilirubin 0.6    Alkaline Phosphatase 74    AST (SGOT) 20    ALT (SGPT) 23    Albumin/Globulin Ratio 1.5    BUN/Creatinine Ratio 16.7    Anion Gap 5.1      CBC          8/13/2024    08:44   CBC   WBC 7.97    RBC 5.04    Hemoglobin 15.9    Hematocrit 47.3    MCV 93.8    MCH 31.5    MCHC 33.6    RDW 12.9    Platelets 125        Lipid Panel          8/13/2024    08:44   Lipid Panel   Total Cholesterol 108    Triglycerides 91    HDL Cholesterol 32    VLDL Cholesterol 18    LDL Cholesterol  58    LDL/HDL Ratio 1.81           Data reviewed: Cardiology " studies      Echocardiogram done on 2/10/2021 showed    1. Overall LV ejection fraction is 55% with mild mid inferior wall hypokinesis.   2. No hemodynamically significant valvular abnormalities.                      Assessment and Plan        Diagnoses and all orders for this visit:    1. Coronary artery disease involving native coronary artery of native heart without angina pectoris (Primary)  Assessment & Plan:  No anginal symptoms.  Continues to have atypical mild chest discomfort, unchanged over the past several years.  Encouraged to restart taking aspirin 81 mg daily.  Continue beta-blockers and statins.      2. Hypertension, essential  Assessment & Plan:  Blood pressure currently well-controlled.  Continue losartan and metoprolol.      3. Mixed hyperlipidemia  Assessment & Plan:  Labs done last year showed LDL of 58, at goal.  Continue atorvastatin 40 mg nightly.  He has PCP appointment in the coming few weeks.                Follow Up     Return in about 1 year (around 5/12/2026) for Next scheduled follow up.    Patient was given instructions and counseling regarding his condition or for health maintenance advice. Please see specific information pulled into the AVS if appropriate.

## 2025-05-28 ENCOUNTER — TELEPHONE (OUTPATIENT)
Dept: CARDIOLOGY | Facility: CLINIC | Age: 60
End: 2025-05-28
Payer: COMMERCIAL

## 2025-05-28 NOTE — TELEPHONE ENCOUNTER
Procedure: Colonoscopy and/or EGD     Medication Directive: NA     PMH: HLD, HTN, CAD s/p PCI (3/25/2020)     Last Seen:05/12/25

## 2025-06-17 NOTE — PAT
Left message on voicemail with arrival time of  0600.    Come to [facility], entrance [address]. Bring picture ID and insurance card, have  over 18 to give ride home.     Bring medication list but do not take any meds except inhalers that morning. Bring medications with you to the hospital, including inhalers.     Complete prep prior to arrival. Clear liquids all day the day before, and start prep as instructed.     Complete prep and any water may need to drink at least 2 hours prior to arrival. No gum, mints, water, or anything else in mouth after that.      Plan to be here at least 3-4 hours. Do not bring any valuables with you to the hospital.     Call [phone] with any questions.

## 2025-06-18 NOTE — PAT
2nd PAT call attempted, no answer. Left message requesting call back to confirm receipt of previous message and if plan to be at procedure and any questions. Left number [phone].

## 2025-06-25 ENCOUNTER — ANESTHESIA EVENT (OUTPATIENT)
Dept: GASTROENTEROLOGY | Facility: HOSPITAL | Age: 60
End: 2025-06-25
Payer: COMMERCIAL

## 2025-06-25 NOTE — ANESTHESIA PREPROCEDURE EVALUATION
Anesthesia Evaluation     Patient summary reviewed   NPO Solid Status: > 8 hours  NPO Liquid Status: > 2 hours           Airway   Mallampati: II  TM distance: >3 FB  Neck ROM: full  No difficulty expected  Dental - normal exam     Pulmonary - normal exam    breath sounds clear to auscultation  (+) a smoker (37.5 pack year) Current, Smoked day of surgery, cigarettes,  Cardiovascular - normal exam  Exercise tolerance: good (4-7 METS)    ECG reviewed  Patient on routine beta blocker and Beta blocker not taken-may be given intraoperatively  Rhythm: regular  Rate: normal    (+) hypertension well controlled 2 medications or greater, past MI  >12 months, CAD, hyperlipidemia      Neuro/Psych- negative ROS  GI/Hepatic/Renal/Endo - negative ROS     Musculoskeletal (-) negative ROS    Abdominal   (+) obese    Abdomen: soft.   Substance History - negative use     OB/GYN          Other - negative ROS       ROS/Med Hx Other: Doesn't take baby aspirin regularly its been greater than a week    ROS/Med Hx Other: EKG on 11-26-23:  NSR, HR 61     Cardiac cath on 5-20-21:  1. Patent stent noted in the proximal to mid right coronary artery with no    in-stent re-stenosis.    2. Non-obstructive coronary artery disease noted in diagonal 1 branch, which    is unchanged from previous angiograms in 2020.    3. Overall LV ejection fraction is 55% with mild mid inferior wall    hypokinesis.    4. There are no culprit lesions identified for patient presentation of unstable angina indicating that the symptoms are likely non-cardiac.       Cardiac clearance per Dr. Minaya on 2-13-25 with moderate cardiac risk       ECHO 3/20/20  IMPRESSION:    1.  Normal ejection fraction of 55%.    2.  Mild left ventricular hypertrophy.    3.  Dilated IVC consistent with increased right atrial filling pressures.      Clearance per Dr. Minaya for moderate cardiac risk                Anesthesia Plan    ASA 3     general   total IV anesthesia  (Total IV  Anesthesia    Patient understands anesthesia not responsible for dental damage.  )  intravenous induction     Anesthetic plan, risks, benefits, and alternatives have been provided, discussed and informed consent has been obtained with: patient.  Pre-procedure education provided  Plan discussed with CRNA.    CODE STATUS:

## 2025-06-26 ENCOUNTER — ANESTHESIA (OUTPATIENT)
Dept: GASTROENTEROLOGY | Facility: HOSPITAL | Age: 60
End: 2025-06-26
Payer: COMMERCIAL

## 2025-06-26 ENCOUNTER — HOSPITAL ENCOUNTER (OUTPATIENT)
Facility: HOSPITAL | Age: 60
Setting detail: HOSPITAL OUTPATIENT SURGERY
Discharge: HOME | End: 2025-06-26
Attending: SURGERY | Admitting: SURGERY
Payer: COMMERCIAL

## 2025-06-26 VITALS
WEIGHT: 231.48 LBS | RESPIRATION RATE: 18 BRPM | SYSTOLIC BLOOD PRESSURE: 147 MMHG | OXYGEN SATURATION: 98 % | HEIGHT: 75 IN | BODY MASS INDEX: 28.78 KG/M2 | HEART RATE: 60 BPM | DIASTOLIC BLOOD PRESSURE: 98 MMHG | TEMPERATURE: 97.4 F

## 2025-06-26 DIAGNOSIS — D36.9 TUBULOVILLOUS ADENOMA: ICD-10-CM

## 2025-06-26 PROCEDURE — 88305 TISSUE EXAM BY PATHOLOGIST: CPT | Performed by: SURGERY

## 2025-06-26 PROCEDURE — 25010000002 LIDOCAINE PF 2% 2 % SOLUTION: Performed by: NURSE ANESTHETIST, CERTIFIED REGISTERED

## 2025-06-26 PROCEDURE — 25010000002 PROPOFOL 10 MG/ML EMULSION: Performed by: NURSE ANESTHETIST, CERTIFIED REGISTERED

## 2025-06-26 PROCEDURE — 25810000003 LACTATED RINGERS PER 1000 ML: Performed by: NURSE ANESTHETIST, CERTIFIED REGISTERED

## 2025-06-26 RX ORDER — SODIUM CHLORIDE, SODIUM LACTATE, POTASSIUM CHLORIDE, CALCIUM CHLORIDE 600; 310; 30; 20 MG/100ML; MG/100ML; MG/100ML; MG/100ML
30 INJECTION, SOLUTION INTRAVENOUS CONTINUOUS
Status: DISCONTINUED | OUTPATIENT
Start: 2025-06-26 | End: 2025-06-26 | Stop reason: HOSPADM

## 2025-06-26 RX ORDER — SODIUM CHLORIDE 0.9 % (FLUSH) 0.9 %
10 SYRINGE (ML) INJECTION AS NEEDED
Status: DISCONTINUED | OUTPATIENT
Start: 2025-06-26 | End: 2025-06-26 | Stop reason: HOSPADM

## 2025-06-26 RX ORDER — SODIUM CHLORIDE 9 MG/ML
40 INJECTION, SOLUTION INTRAVENOUS AS NEEDED
Status: DISCONTINUED | OUTPATIENT
Start: 2025-06-26 | End: 2025-06-26 | Stop reason: HOSPADM

## 2025-06-26 RX ORDER — SODIUM CHLORIDE 0.9 % (FLUSH) 0.9 %
3 SYRINGE (ML) INJECTION EVERY 12 HOURS SCHEDULED
Status: DISCONTINUED | OUTPATIENT
Start: 2025-06-26 | End: 2025-06-26 | Stop reason: HOSPADM

## 2025-06-26 RX ORDER — LIDOCAINE HYDROCHLORIDE 20 MG/ML
INJECTION, SOLUTION EPIDURAL; INFILTRATION; INTRACAUDAL; PERINEURAL AS NEEDED
Status: DISCONTINUED | OUTPATIENT
Start: 2025-06-26 | End: 2025-06-26 | Stop reason: SURG

## 2025-06-26 RX ORDER — PROPOFOL 10 MG/ML
VIAL (ML) INTRAVENOUS AS NEEDED
Status: DISCONTINUED | OUTPATIENT
Start: 2025-06-26 | End: 2025-06-26 | Stop reason: SURG

## 2025-06-26 RX ADMIN — SODIUM CHLORIDE, POTASSIUM CHLORIDE, SODIUM LACTATE AND CALCIUM CHLORIDE 30 ML/HR: 600; 310; 30; 20 INJECTION, SOLUTION INTRAVENOUS at 06:49

## 2025-06-26 RX ADMIN — PROPOFOL 100 MG: 10 INJECTION, EMULSION INTRAVENOUS at 07:54

## 2025-06-26 RX ADMIN — LIDOCAINE HYDROCHLORIDE 100 MG: 20 INJECTION, SOLUTION EPIDURAL; INFILTRATION; INTRACAUDAL; PERINEURAL at 07:54

## 2025-06-26 RX ADMIN — PROPOFOL 250 MCG/KG/MIN: 10 INJECTION, EMULSION INTRAVENOUS at 07:54

## 2025-06-26 NOTE — H&P
General Surgery/Colorectal Surgery Note    Patient Name:  Demetrio Mantilla  YOB: 1965  [id number]    Referring Provider: Odilon Chew, *      Patient Care Team:  Lonnie Barboza APRN as PCP - General (Nurse Practitioner)  Aaron Minaya MD as Consulting Physician (Cardiology)  Maribell Isabel APRN as Nurse Practitioner (Nurse Practitioner)  Odilon Chew MD as Consulting Physician (General Surgery)    Subjective .     History of present illness:    HPI   after undergoing colonoscopy on 3/11/2025 performed by Dr. Odilon Chew.     Patient was with a tubular adenoma of the cecum and a sessile serrated lesion of the cecum; 15mm tubulovillous adenoma of the ascending colon; 2 tubular adenomas of the transverse colon; 12 mm tubulovillous adenoma of the descending colon; 3 tubulovillous adenomas with high-grade dysplasia of the sigmoid colon and a tubular adenoma of the rectum per colonoscopy/pathology.  Resection retrieval were complete.    History:  Past Medical History:   Diagnosis Date    Coronary artery disease     Hyperlipidemia     Hypertension     Limb swelling     Myocardial infarction        Past Surgical History:   Procedure Laterality Date    COLONOSCOPY  01/01/2016    COLONOSCOPY N/A 03/11/2025    Procedure: COLONOSCOPY WITH POLYPECTOMIES HOT/COLD SNARE. CLIP APPLICATION X1;  Surgeon: Odilon Chwe MD;  Location: [facility] ENDOSCOPY;  Service: General;  Laterality: N/A;  COLON POLYPS. DIVERTICULOSIS    CORONARY ANGIOPLASTY WITH STENT PLACEMENT      2020-SEES DR ABRAHAM YEARLY.       Family History   Problem Relation Age of Onset    Cancer Mother         Unspecified    No Known Problems Father     Diabetes Brother         Unspecified type       Social History     Tobacco Use    Smoking status: Every Day     Current packs/day: 1.50     Average packs/day: 1.5 packs/day for 25.0 years (37.5 ttl pk-yrs)     Types: Cigarettes    Smokeless tobacco: Never   Vaping Use     Vaping status: Never Used   Substance Use Topics    Alcohol use: Yes     Alcohol/week: 6.0 standard drinks of alcohol     Types: 6 Cans of beer per week     Comment: socially    Drug use: Never       Review of Systems: See HPI    Review of Systems            Medications Prior to Admission   Medication Sig Dispense Refill Last Dose/Taking    atorvastatin (LIPITOR) 40 MG tablet Take 1 tablet by mouth every night at bedtime. 90 tablet 3 Past Week    clobetasol (TEMOVATE) 0.05 % ointment APPLY A THIN LAYER TO FEET AND LEGS TWICE EVERY DAY AS NEEDED   Past Week    losartan (COZAAR) 50 MG tablet Take 1 tablet by mouth Daily. 90 tablet 3 Past Week    metoprolol succinate XL (TOPROL-XL) 25 MG 24 hr tablet Take 1 tablet by mouth Daily. 90 tablet 3 Past Week    triamcinolone (KENALOG) 0.1 % cream APPLY TWICE DAILY TO LEFT CHEEK AS NEEDED.   Past Week    aspirin 81 MG chewable tablet Chew 1 tablet Daily. (Patient not taking: Reported on 5/12/2025)   More than a month         Home Meds:      Prior to Admission medications    Medication Sig Start Date End Date Taking? Authorizing Provider   atorvastatin (LIPITOR) 40 MG tablet Take 1 tablet by mouth every night at bedtime. 8/8/24  Yes Aaron Minaya MD   clobetasol (TEMOVATE) 0.05 % ointment APPLY A THIN LAYER TO FEET AND LEGS TWICE EVERY DAY AS NEEDED 3/6/25  Yes Steve Yañez MD   losartan (COZAAR) 50 MG tablet Take 1 tablet by mouth Daily. 8/8/24  Yes Aaron Minaya MD   metoprolol succinate XL (TOPROL-XL) 25 MG 24 hr tablet Take 1 tablet by mouth Daily. 8/8/24  Yes Aaron Minaya MD   triamcinolone (KENALOG) 0.1 % cream APPLY TWICE DAILY TO LEFT CHEEK AS NEEDED. 2/5/25  Yes Steve Yañez MD   aspirin 81 MG chewable tablet Chew 1 tablet Daily.  Patient not taking: Reported on 5/12/2025    Emergency, Nurse Malgorzata, RN            Allergies:  Patient has no known allergies.      Objective     Vital Signs   Temp:  [97.6 °F (36.4 °C)] 97.6 °F (36.4  °C)  Heart Rate:  [56] 56  Resp:  [16] 16  BP: (153)/(92) 153/92    Physical Exam:     Physical Exam    NAD, A/O x 4, normal circulation, normal respiration      Result Review :Labs  Result Review  Imaging  Med Tab  Media    Assessment & Plan     Diagnoses and all orders for this visit:    1. Tubulovillous adenoma  -     sodium chloride 0.9 % flush 3 mL  -     sodium chloride 0.9 % flush 10 mL  -     sodium chloride 0.9 % infusion 40 mL    Other orders  -     Continuous Pulse Oximetry; Standing  -     POC Glucose Once; Standing  -     Insert Peripheral IV; Standing  -     lactated ringers infusion  -     Follow Anesthesia Guidelines / Protocol; Standing  -     Verify Bowel Prep Was Successful; Standing  -     Give Tap Water Enema If Bowel Prep Insufficient; Standing  -     Insert Peripheral IV; Standing  -     Saline Lock & Maintain IV Access; Standing  -     Place Sequential Compression Device; Standing  -     Maintain Sequential Compression Device; Standing  -     Verify NPO Status; Standing  -     Continuous Pulse Oximetry  -     POC Glucose Once  -     Insert Peripheral IV  -     Follow Anesthesia Guidelines / Protocol  -     Verify Bowel Prep Was Successful  -     Give Tap Water Enema If Bowel Prep Insufficient  -     Insert Peripheral IV  -     Saline Lock & Maintain IV Access  -     Place Sequential Compression Device  -     Maintain Sequential Compression Device  -     Verify NPO Status           Risks including bleeding, perforation, pain, infection, missed polyp(s). Benefits, and alternatives of Colonoscopy discussed with patient.  All questions answered.  Consent verified.         Odilon Chew MD  06/26/25 07:49 EDT

## 2025-06-26 NOTE — ANESTHESIA POSTPROCEDURE EVALUATION
Patient: Demetrio Mantilla    Procedure Summary       Date: 06/26/25 Room / Location: [facility] ENDOSCOPY 1 / [facility] ENDOSCOPY    Anesthesia Start: 0753 Anesthesia Stop: 0825    Procedure: COLONOSCOPY with biopsy and hot snare polypectomy Diagnosis:       Tubulovillous adenoma      (Tubulovillous adenoma [D36.9])    Surgeons: Odilon Chew MD Provider: Onofre Avila CRNA    Anesthesia Type: general ASA Status: 3            Anesthesia Type: general    Vitals  Vitals Value Taken Time   /98 06/26/25 08:40   Temp 36.3 °C (97.4 °F) 06/26/25 08:25   Pulse 58 06/26/25 08:42   Resp 18 06/26/25 08:40   SpO2 98 % 06/26/25 08:42   Vitals shown include unfiled device data.        Post Anesthesia Care and Evaluation    Post-procedure mental status: acceptable.  Pain management: satisfactory to patient    Airway patency: patent  Anesthetic complications: No anesthetic complications    Cardiovascular status: acceptable  Respiratory status: acceptable    Comments: Per chart review

## 2025-07-14 ENCOUNTER — OFFICE VISIT (OUTPATIENT)
Dept: SURGERY | Facility: CLINIC | Age: 60
End: 2025-07-14
Payer: COMMERCIAL

## 2025-07-14 ENCOUNTER — PREP FOR SURGERY (OUTPATIENT)
Dept: OTHER | Facility: HOSPITAL | Age: 60
End: 2025-07-14
Payer: COMMERCIAL

## 2025-07-14 VITALS — WEIGHT: 231 LBS | BODY MASS INDEX: 28.72 KG/M2 | HEIGHT: 75 IN

## 2025-07-14 DIAGNOSIS — Z98.890 S/P COLONOSCOPY WITH POLYPECTOMY: ICD-10-CM

## 2025-07-14 DIAGNOSIS — Z72.0 TOBACCO USE: ICD-10-CM

## 2025-07-14 DIAGNOSIS — D36.9 TUBULOVILLOUS ADENOMA: Primary | ICD-10-CM

## 2025-07-14 DIAGNOSIS — K63.5 HYPERPLASTIC COLONIC POLYP, UNSPECIFIED PART OF COLON: ICD-10-CM

## 2025-07-14 DIAGNOSIS — D12.6 TUBULAR ADENOMA OF COLON: ICD-10-CM

## 2025-07-14 PROCEDURE — 99213 OFFICE O/P EST LOW 20 MIN: CPT | Performed by: NURSE PRACTITIONER

## 2025-07-14 RX ORDER — SODIUM CHLORIDE 0.9 % (FLUSH) 0.9 %
10 SYRINGE (ML) INJECTION AS NEEDED
OUTPATIENT
Start: 2025-07-14

## 2025-07-14 RX ORDER — SODIUM CHLORIDE 9 MG/ML
40 INJECTION, SOLUTION INTRAVENOUS AS NEEDED
OUTPATIENT
Start: 2025-07-14

## 2025-07-14 RX ORDER — PEG-3350, SODIUM SULFATE, SODIUM CHLORIDE, POTASSIUM CHLORIDE, SODIUM ASCORBATE AND ASCORBIC ACID 7.5-2.691G
1000 KIT ORAL ONCE
Qty: 1000 ML | Refills: 0 | Status: SHIPPED | OUTPATIENT
Start: 2025-07-14 | End: 2025-07-14

## 2025-07-14 RX ORDER — SODIUM CHLORIDE 0.9 % (FLUSH) 0.9 %
3 SYRINGE (ML) INJECTION EVERY 12 HOURS SCHEDULED
OUTPATIENT
Start: 2025-07-14

## 2025-07-14 NOTE — PROGRESS NOTES
Chief Complaint: Follow-up    Subjective      Follow-up visit       History of Present Illness  Demetrio Mantilla is a 59 y.o. male presents to [facility] GENERAL SURGERY for follow-up visit.    Patient presents today for follow-up visit after undergoing colonoscopy on 6/26/2025 performed by Dr. Odilon Chew.  Patient was with 13 polyps total in ascending, transverse, sigmoid, and rectum.  These polyps came back to be tubular adenoma, tubulovillous adenomas, hyperplastic polyps per colonoscopy/pathology.  Resection and retrieval were complete.    Results for orders placed or performed during the hospital encounter of 06/26/25   Tissue Pathology Exam    Collection Time: 06/26/25  8:06 AM    Specimen: A: Large Intestine, Right / Ascending Colon; Tissue    B: Large Intestine, Transverse Colon; Tissue    C: Large Intestine, Sigmoid Colon; Tissue    D: Large Intestine, Rectum; Tissue   Result Value Ref Range    Case Report       Surgical Pathology Report                         Case: [id number]                                  Authorizing Provider:  Odilon Chew MD  Collected:           06/26/2025 08:06 AM          Ordering Location:     [facility] Received:            06/26/2025 09:53 AM                                 SUITES                                                                       Pathologist:           Shyann Faria DO                                                       Specimens:   1) - Large Intestine, Right / Ascending Colon, ascending colon polyp biopsy                         2) - Large Intestine, Transverse Colon, transverse colon polyp biopsy                               3) - Large Intestine, Sigmoid Colon, sigmoid colon polyps biopsy and hot snare                      polypectomy                                                                                         4) - Large Intestine, Rectum, rectum polyps hot snare polypectomy            "               Clinical Information       Tubulovillous adenoma      Final Diagnosis       1. Ascending colon polyp, biopsy:   - Tubular adenoma      2. Transverse colon polyp, biopsy:   - Tubular adenoma      3. Sigmoid colon polyps, biopsy:   - Fragments of tubulovillous adenoma, tubular adenoma and hyperplastic polyp      4. Rectum polyps, biopsy:   - Fragments of tubular adenoma, mucosal prolapse polyp and hyperplastic polyp          Gross Description       1. Large Intestine, Right / Ascending Colon.  Received in formalin labeled \"ascending colon polyp biopsy\" consists of a single pink-tan soft tissue fragment 0.6 cm.  The specimen is submitted entirely in cassette 1A.    2. Large Intestine, Transverse Colon.  Received in formalin labeled \"transverse colon polyp biopsy\" consists of a single pink-tan soft tissue fragment 0.5 cm.  The specimen is submitted entirely in cassette 2A.    3. Large Intestine, Sigmoid Colon.  In formalin labeled \"sigmoid colon polyps biopsy and hot snare polypectomy\" consists of an aggregate of pink-tan soft tissue fragments 1.5 x 1.3 x 0.8 cm.  The specimens are submitted entirely in cassette 3A.    4. Large Intestine, Rectum.  Received in formalin labeled \"rectum polyps hot snare polypectomy\" consists of multiple pink-tan soft tissue fragments ranging from minute up to 0.4 cm in greatest dimension.  The specimens are submitted entirely in cassette 4A.     PF        Microscopic Description       Microscopic examination performed.       Colonoscopy performed that difficulty.  The patient tolerated the procedure well.    Patient denies any postoperative complications.  Objective     Past Medical History:   Diagnosis Date    Coronary artery disease     Hyperlipidemia     Hypertension     Limb swelling     Myocardial infarction        Past Surgical History:   Procedure Laterality Date    COLONOSCOPY  01/01/2016    COLONOSCOPY N/A 03/11/2025    Procedure: COLONOSCOPY WITH POLYPECTOMIES " HOT/COLD SNARE. CLIP APPLICATION X1;  Surgeon: Odilon Chew MD;  Location: [facility] ENDOSCOPY;  Service: General;  Laterality: N/A;  COLON POLYPS. DIVERTICULOSIS    COLONOSCOPY N/A 6/26/2025    Procedure: COLONOSCOPY with biopsy and hot snare polypectomy;  Surgeon: Odilon Chew MD;  Location: [facility] ENDOSCOPY;  Service: General;  Laterality: N/A;  colon polyps <10    CORONARY ANGIOPLASTY WITH STENT PLACEMENT      2020-SEES DR ABRAHAM YEARLY.       Outpatient Medications Marked as Taking for the 7/14/25 encounter (Office Visit) with Chalo April, APRN   Medication Sig Dispense Refill    atorvastatin (LIPITOR) 40 MG tablet Take 1 tablet by mouth every night at bedtime. 90 tablet 3    clobetasol (TEMOVATE) 0.05 % ointment APPLY A THIN LAYER TO FEET AND LEGS TWICE EVERY DAY AS NEEDED      losartan (COZAAR) 50 MG tablet Take 1 tablet by mouth Daily. 90 tablet 3    metoprolol succinate XL (TOPROL-XL) 25 MG 24 hr tablet Take 1 tablet by mouth Daily. 90 tablet 3    triamcinolone (KENALOG) 0.1 % cream APPLY TWICE DAILY TO LEFT CHEEK AS NEEDED.         No Known Allergies     Family History   Problem Relation Age of Onset    Cancer Mother         Unspecified    No Known Problems Father     Diabetes Brother         Unspecified type       Social History     Socioeconomic History    Marital status:    Tobacco Use    Smoking status: Every Day     Current packs/day: 1.50     Average packs/day: 1.5 packs/day for 25.0 years (37.5 ttl pk-yrs)     Types: Cigarettes    Smokeless tobacco: Never   Vaping Use    Vaping status: Never Used   Substance and Sexual Activity    Alcohol use: Yes     Alcohol/week: 6.0 standard drinks of alcohol     Types: 6 Cans of beer per week     Comment: socially    Drug use: Never    Sexual activity: Yes     Partners: Female       Review of Systems   Constitutional:  Negative for chills and fever.   Gastrointestinal:  Negative for abdominal distention, abdominal pain, anal bleeding,  "blood in stool, constipation, diarrhea and rectal pain.        Vital Signs:   Ht 190.5 cm (75\")   Wt 105 kg (231 lb)   BMI 28.87 kg/m²      Physical Exam  Vitals and nursing note reviewed.   Constitutional:       General: He is not in acute distress.     Appearance: He is obese. He is not ill-appearing.   HENT:      Head: Normocephalic and atraumatic.   Cardiovascular:      Rate and Rhythm: Normal rate.   Pulmonary:      Effort: Pulmonary effort is normal.      Breath sounds: No stridor.   Abdominal:      Palpations: Abdomen is soft.      Tenderness: There is no guarding.   Musculoskeletal:         General: No deformity. Normal range of motion.   Skin:     General: Skin is warm and dry.      Coloration: Skin is not jaundiced.   Neurological:      General: No focal deficit present.      Mental Status: He is alert and oriented to person, place, and time.   Psychiatric:         Mood and Affect: Mood normal.         Thought Content: Thought content normal.          Result Review :          []  Laboratory  []  Radiology  []  Pathology  []  Microbiology  []  EKG/Telemetry   []  Cardiology/Vascular   []  Old records  I spent 15 minutes caring for Demetrio on this date of service. This time includes time spent by me in the following activities: preparing for the visit, reviewing tests, obtaining and/or reviewing a separately obtained history, performing a medically appropriate examination and/or evaluation, counseling and educating the patient/family/caregiver, ordering medications, tests, or procedures, referring and communicating with other health care professionals, documenting information in the medical record, independently interpreting results and communicating that information with the patient/family/caregiver, and care coordination       Assessment and Plan    Diagnoses and all orders for this visit:    1. Tubulovillous adenoma (Primary)    2. Tobacco use    3. S/P colonoscopy with polypectomy    4. Tubular adenoma " of colon    5. Hyperplastic colonic polyp, unspecified part of colon    Other orders  -     PEG-KCl-NaCl-NaSulf-Na Asc-C (MOVIPREP) 100 g reconstituted solution powder; Take 1,000 mL by mouth 1 (One) Time for 1 dose.  Dispense: 1000 mL; Refill: 0        Follow Up   Return for Schedule colonoscopy with Dr. Chew on 12/30/2025 at [facility].    Hospital arrival time: 12:00.    Possible risks/complications, benefits, and alternatives to surgical or invasive procedures have been explained to patient and/or legal guardian.    Patient has been evaluated and can tolerate anesthesia and/or sedation. Risks, benefits, and alternatives to anesthesia and sedation have been explained to the patient and/or legal guardian. Patient verbalizes understanding and is willing to proceed with the above plan.     Patient was given instructions and counseling regarding his condition or for health maintenance advice. Please see specific information pulled into the AVS if appropriate.     As always, it has been a pleasure to participate in your patient's care. Please call with questions or concerns.

## 2025-07-17 ENCOUNTER — TELEPHONE (OUTPATIENT)
Dept: SURGERY | Facility: CLINIC | Age: 60
End: 2025-07-17
Payer: COMMERCIAL

## 2025-07-17 NOTE — TELEPHONE ENCOUNTER
----- Message -----  From: Chalo April, JORGE  Sent: 7/8/2025   8:20 AM EDT  To: Brina Robertson    1 year colon recall. Mb    Date of most recent Colonoscopy:06/26/2025  Date of next expected Colonoscopy:06/26/2026    Recall has been entered into the patient's chart and Care Gap has been updated.

## 2025-07-29 DIAGNOSIS — D36.9 TUBULAR ADENOMA: ICD-10-CM

## 2025-07-29 DIAGNOSIS — D36.9 TUBULOVILLOUS ADENOMA: ICD-10-CM

## 2025-07-29 DIAGNOSIS — Z98.890 S/P COLONOSCOPY WITH POLYPECTOMY: ICD-10-CM

## 2025-07-29 DIAGNOSIS — D12.6 HIGH GRADE DYSPLASIA IN COLONIC ADENOMA: ICD-10-CM

## 2025-07-29 DIAGNOSIS — D12.6 SESSILE SERRATED POLYP OF COLON: ICD-10-CM

## (undated) DEVICE — THE DISPOSABLE ROTH NET FOREIGN BODY STANDARD RETRIEVAL DEVICE IS USED IN THE ENDOSCOPIC RETRIEVAL OF FOREIGN BODY, FOOD BOLUS AND EXCISED TISSUE SUCH AS POLYPS.: Brand: ROTH NET

## (undated) DEVICE — PAD GRND REM POLYHESIVE A/ DISP

## (undated) DEVICE — SNAR POLYP CAPTIFLEX XS/OVL 11X2.4MM 240CM 1P/U

## (undated) DEVICE — THE STERILE LIGHT HANDLE COVER IS USED WITH STERIS SURGICAL LIGHTING AND VISUALIZATION SYSTEMS.

## (undated) DEVICE — TUBING, SUCTION, 1/4" X 10', STRAIGHT: Brand: MEDLINE

## (undated) DEVICE — THE SINGLE USE ETRAP – POLYP TRAP IS USED FOR SUCTION RETRIEVAL OF ENDOSCOPICALLY REMOVED POLYPS.: Brand: ETRAP

## (undated) DEVICE — DEFENDO AIR WATER SUCTION AND BIOPSY VALVE KIT FOR  OLYMPUS: Brand: DEFENDO AIR/WATER/SUCTION AND BIOPSY VALVE

## (undated) DEVICE — CONN JET HYDRA H20 AUXILIARY DISP

## (undated) DEVICE — LINER SURG CANSTR SXN S/RIGD 1500CC

## (undated) DEVICE — SOL IRR H2O BO 1000ML STRL

## (undated) DEVICE — STERILE POLYISOPRENE POWDER-FREE SURGICAL GLOVES: Brand: PROTEXIS

## (undated) DEVICE — SOL IRRG H2O PL/BG 1000ML STRL

## (undated) DEVICE — SOLIDIFIER LIQLOC PLS 1500CC BT

## (undated) DEVICE — STERILE POLYISOPRENE POWDER-FREE SURGICAL GLOVES WITH EMOLLIENT COATING: Brand: PROTEXIS

## (undated) DEVICE — Device

## (undated) DEVICE — SINGLE-USE BIOPSY FORCEPS: Brand: RADIAL JAW 4

## (undated) DEVICE — SNAR POLYP CAPTIFLX WD OVL 27MM 240CM